# Patient Record
Sex: MALE | Race: BLACK OR AFRICAN AMERICAN | NOT HISPANIC OR LATINO | URBAN - METROPOLITAN AREA
[De-identification: names, ages, dates, MRNs, and addresses within clinical notes are randomized per-mention and may not be internally consistent; named-entity substitution may affect disease eponyms.]

---

## 2017-01-26 ENCOUNTER — TELEPHONE (OUTPATIENT)
Dept: PSYCHIATRY | Facility: CLINIC | Age: 37
End: 2017-01-26

## 2017-01-26 DIAGNOSIS — F84.0 AUTISM SPECTRUM DISORDER: Primary | ICD-10-CM

## 2017-01-26 NOTE — TELEPHONE ENCOUNTER
Medication Requested: Hydroxyzine 50 mg caps  Last Written RX Date: 6-23-16  Last Pharmacy Fill Date: 12-23-16  Last RX Quantity: 30,   # refills: 6    Last Office Visit: 6-23-16  Recommended RTC: 3-6 mo  Next Scheduled Office Visit: NONE    Since last Visit: # of CX 0 ,# of NOS 0    Last Visit Recommendations for:  Medications: cpntinue  Labs: 0    Will send message to scheduling for an appointment.  Will then route to provider due to delay in follow up.      Kathleen M Doege RN

## 2017-01-30 RX ORDER — HYDROXYZINE PAMOATE 50 MG/1
50 CAPSULE ORAL AT BEDTIME
Qty: 30 CAPSULE | Refills: 0 | Status: SHIPPED | OUTPATIENT
Start: 2017-01-30 | End: 2017-03-14

## 2017-01-30 NOTE — TELEPHONE ENCOUNTER
No appointment scheduled to date.    Will refill 30 day supply per protocol. Instructions on the order to call the clinic for an appointment.  Then route to provider as FYMIGEL.    Kathleen M Doege RN

## 2017-02-13 DIAGNOSIS — F84.0 AUTISM: ICD-10-CM

## 2017-02-13 NOTE — TELEPHONE ENCOUNTER
Medication Requested: ARIPiprazole (ABILIFY) 30 MG   Last Written RX Date: 6/23/16  Last Pharmacy Fill Date: 1/13/17  Last RX Quantity: 30,   # refills: 6    Last Office Visit: 6/23/16  Recommended RTC: 3-6 MOS  Next Scheduled Office Visit: NONE  OVER DUE    Since last Visit: # of CX 0 ,# of NOS 0    Last Visit Recommendations for:  Medications: CONTINUE  Labs: 0  Will send message to scheduling for an appointment. OVER DUE   Elizabeth Diaz Julia B, RN                 The number listed is disconnected and there is no other [facility] contact number.     Thanks!

## 2017-02-15 RX ORDER — ARIPIPRAZOLE 30 MG/1
30 TABLET ORAL DAILY
Qty: 30 TABLET | Refills: 0 | Status: SHIPPED | OUTPATIENT
Start: 2017-02-15 | End: 2017-05-18

## 2017-03-13 DIAGNOSIS — F84.0 AUTISM SPECTRUM DISORDER: ICD-10-CM

## 2017-03-13 NOTE — TELEPHONE ENCOUNTER
Medication Requested: Hydroxyzine 50 mg caps  Last Written RX Date: 1-30-17  Last Pharmacy Fill Date: 1-30-17  Last RX Quantity: 30,   # refills: 0    Last Office Visit: 6-23-16  Recommended RTC: 3-6 mo  Next Scheduled Office Visit: none    Since last Visit: # of CX 0 ,# of NOS 0    Last Visit Recommendations for:  Medications: continue  Labs: 0      Will send message to scheduling for an appointment.  Stephen then refill per protocol.    Kathleen M Doege RN

## 2017-03-14 RX ORDER — HYDROXYZINE PAMOATE 50 MG/1
50 CAPSULE ORAL AT BEDTIME
Qty: 30 CAPSULE | Refills: 0 | Status: SHIPPED | OUTPATIENT
Start: 2017-03-14 | End: 2017-05-18

## 2017-03-14 NOTE — TELEPHONE ENCOUNTER
Burd, Holly B. Doege, Summer QUINTANA, ANY                   I called the number listed for the pt and it is not working. He does not have my chart either.     Pat           Order for 30 day supply/qyt sufficient to next appointment sent to pharmacy to avoid decompensation.    Provider sent an FYI.      Kathleen M Doege RN

## 2017-03-20 ENCOUNTER — TELEPHONE (OUTPATIENT)
Dept: PSYCHIATRY | Facility: CLINIC | Age: 37
End: 2017-03-20

## 2017-03-20 NOTE — TELEPHONE ENCOUNTER
Medication Requested: Abilify 30 mg tabs  Last Written RX Date: 2-15-17  Last Pharmacy Fill Date: 2-15-17  Last RX Quantity: 30,   # refills: 0    Last Office Visit: 6-23-17  Recommended RTC:  3-6 mo  Next Scheduled Office Visit: none    Since last Visit: # of CX 0 ,# of NOS 0    Last Visit Recommendations for:  Medications: decrease by 2.5 mg daily  Labs: 0    Pt is to be decreasing dose by 2.5 mg - but only has 10 mg and 30 mg tabs ordered.  We have no contact information for the patient.    Will route to provider for clarification of dose and due to the fact that we have no contact information and pt has no appointment.        Kathleen M Doege RN

## 2017-03-21 NOTE — TELEPHONE ENCOUNTER
Per 6/23/16 appt note:    -Taper Abilify by 2.5 mg per day.  -If taper & d/c instructions were followed pt would have been off Abilify over the course of 14 days (~7/7/16).    Called [facility] pharmacy for updated contact info:  [phone]  -demographics screen is updated with this info    Called pt's mom Sisi:   -Pt is still taking Abilify.  -When she gets home she will check the dose.   -She will call back with dose to be refilled and to schedule appt.

## 2017-03-27 NOTE — TELEPHONE ENCOUNTER
Burd, Holly B. Doege, Kathleen M, RN                   Mom called back and scheduled for the next opening on 5/18/17

## 2017-05-18 ENCOUNTER — OFFICE VISIT (OUTPATIENT)
Dept: PSYCHIATRY | Facility: CLINIC | Age: 37
End: 2017-05-18
Attending: PSYCHIATRY & NEUROLOGY
Payer: MEDICAID

## 2017-05-18 VITALS
SYSTOLIC BLOOD PRESSURE: 130 MMHG | BODY MASS INDEX: 35.11 KG/M2 | HEART RATE: 80 BPM | DIASTOLIC BLOOD PRESSURE: 86 MMHG | WEIGHT: 211 LBS

## 2017-05-18 DIAGNOSIS — F84.0 AUTISM SPECTRUM DISORDER: ICD-10-CM

## 2017-05-18 DIAGNOSIS — F84.0 AUTISM: ICD-10-CM

## 2017-05-18 PROCEDURE — 99212 OFFICE O/P EST SF 10 MIN: CPT | Mod: ZF

## 2017-05-18 RX ORDER — ARIPIPRAZOLE 30 MG/1
30 TABLET ORAL DAILY
Qty: 30 TABLET | Refills: 3 | Status: SHIPPED | OUTPATIENT
Start: 2017-05-18 | End: 2017-10-04

## 2017-05-18 RX ORDER — HYDROXYZINE PAMOATE 50 MG/1
50 CAPSULE ORAL AT BEDTIME
Qty: 30 CAPSULE | Refills: 3 | Status: SHIPPED | OUTPATIENT
Start: 2017-05-18 | End: 2017-10-29

## 2017-05-18 NOTE — MR AVS SNAPSHOT
After Visit Summary   2017    Carloz Drummond    MRN: [id number]           Patient Information     Date Of Birth          1980        Visit Information        Provider Department      2017 9:00 AM Homans, Jonathan C, MD [facility]        Today's Diagnoses     Autism        Autism spectrum disorder           Follow-ups after your visit        Who to contact     Please call your clinic at [phone] to:    Ask questions about your health    Make or cancel appointments    Discuss your medicines    Learn about your test results    Speak to your doctor   If you have compliments or concerns about an experience at your clinic, or if you wish to file a complaint, please contact [facility] Patient Relations at [phone] or email us at [email or web address]         Additional Information About Your Visit        MyChart Information     [employer] is an electronic gateway that provides easy, online access to your medical records. With [employer], you can request a clinic appointment, read your test results, renew a prescription or communicate with your care team.     To sign up for [employer] visit the website at [email or web address]   You will be asked to enter the access code listed below, as well as some personal information. Please follow the directions to create your username and password.     Your access code is: [email or web address]  Expires: 2017  2:35 PM     Your access code will  in 90 days. If you need help or a new code, please contact your [facility] or call [phone] for assistance.        Care EveryWhere ID     This is your Care EveryWhere ID. This could be used by other organizations to access your [facility] medical records  [id number]        Your Vitals Were     Pulse BMI (Body Mass Index)                80 35.11 kg/m2           Blood Pressure from Last 3 Encounters:   17 130/86   16  113/76   10/08/15 139/87    Weight from Last 3 Encounters:   05/18/17 95.7 kg (211 lb)   06/23/16 96.6 kg (213 lb)   10/08/15 101.7 kg (224 lb 3.2 oz)              Today, you had the following     No orders found for display         Today's Medication Changes          These changes are accurate as of: 5/18/17  2:35 PM.  If you have any questions, ask your nurse or doctor.               These medicines have changed or have updated prescriptions.        Dose/Directions    ARIPiprazole 30 MG tablet   Commonly known as:  ABILIFY   This may have changed:  Another medication with the same name was removed. Continue taking this medication, and follow the directions you see here.   Used for:  Autism   Changed by:  Homans, Jonathan C, MD        Dose:  30 mg   Take 1 tablet (30 mg) by mouth daily   Quantity:  30 tablet   Refills:  3         Stop taking these medicines if you haven't already. Please contact your care team if you have questions.     risperiDONE 1 MG ODT tab   Commonly known as:  RISPERDAL M-TAB   Stopped by:  Homans, Jonathan C, MD                Where to get your medicines      These medications were sent to [facility]/pharmacy #[id number] - [place], [place] - [address]  [address], [place] [address]    Hours:  24-hours Phone:  [phone]     ARIPiprazole 30 MG tablet    hydrOXYzine 50 MG capsule                Primary Care Provider Office Phone # Fax #    [facility] [phone]       [address] [place] [id number]        Thank you!     Thank you for choosing PSYCHIATRY CLINIC  for your care. Our goal is always to provide you with excellent care. Hearing back from our patients is one way we can continue to improve our services. Please take a few minutes to complete the written survey that you may receive in the mail after your visit with us. Thank you!             Your Updated Medication List - Protect others around you: Learn how to safely use, store and  throw away your medicines at www.disposemymeds.org.          This list is accurate as of: 5/18/17  2:35 PM.  Always use your most recent med list.                   Brand Name Dispense Instructions for use    ARIPiprazole 30 MG tablet    ABILIFY    30 tablet    Take 1 tablet (30 mg) by mouth daily       hydrOXYzine 50 MG capsule    VISTARIL    30 capsule    Take 1 capsule (50 mg) by mouth At Bedtime or a little earlier to induce sleep       lisinopril-hydrochlorothiazide 10-12.5 MG per tablet    PRINZIDE/ZESTORETIC     Take 1 tablet by mouth daily

## 2017-05-18 NOTE — PROGRESS NOTES
CHILD PSYCHIATRY CLINIC PROGRESS NOTE   Patient has been followed by Dr. Hartman since prior to 2008    INTERIM HISTORY                                                   Carloz Drummond is a 37 year old male who was last seen in clinic on 6/23/2016 at which time Abilify was tapered to 2.5 mg.   Patient presented to clinic with his mother for follow up.  Currently prescribed hydroxyzine 50 mg QHS PRN for sleep  Aripiprazole 30 mg daily   Lisinopril - HCTZ 10-12.5 mg tab     Currently at home, struggles to listen, has difficulty responding to redirection from mother. Mother notes that sometimes he seems difficult to redirect.   No self injury, or aggression.   Works at his job for supportive employment      Social Updates (home/ school/ substance use): Continues to live at home with mother and brothers.        MEDICAL ROS:  Denies headache, vision changes, bloody nose, cough, shortness of breath, chest pain or pressure, dizziness, nausea, vomiting, diarrhea, abdominal pain, muscle or joint pain, numbness or tingling in hands or feet.      PAST PSYCH MED TRIALS                                  Naltrexone (self injury)   Risperidone     MEDICATIONS                               Current Outpatient Prescriptions   Medication Sig Dispense Refill     hydrOXYzine (VISTARIL) 50 MG capsule Take 1 capsule (50 mg) by mouth At Bedtime or a little earlier to induce sleep 30 capsule 0     ARIPiprazole (ABILIFY) 30 MG tablet Take 1 tablet (30 mg) by mouth daily 30 tablet 0     ARIPiprazole (ABILIFY) 10 MG tablet Take 0.5 tablets (5 mg) by mouth daily 30 tablet 6     risperiDONE (RISPERDAL M-TAB) 1 MG disintegrating tablet Take 1 tablet (1 mg) by mouth daily as needed (for agitation) 30 tablet 6     lisinopril-hydrochlorothiazide (PRINZIDE,ZESTORETIC) 10-12.5 MG per tablet Take 1 tablet by mouth daily          VITALS   There were no vitals taken for this visit.   MENTAL STATUS EXAM                                                   "           Alertness: alert  and oriented  Appearance: appears his stated age, with a tall head and low hair line, small ears. Overweight, but wearing clean clothes  Behavior/Demeanor: cooperative and pleasant, with poor eye contact   Speech: mostly one word responses.   Language: lmited vocabulary  Psychomotor: frequently rocking and/or shuffling cards.   Mood: \"happy\"   Affect: bright and euthymic.  Thought Process/Associations: linear, concrete and logical.   Thought Content:  Denies SI, HI,   Perception:  Denies hallucinations or delusions   Insight: poor  Judgment: limited  Cognition:  does not appear grossly intact; formal cognitive testing was not done    LABS and DATA     No lab data, mother indicates that PCP is monitoring lipids.     DIAGNOSIS   ASD     ASSESSMENT                                     Carloz Drummond is a 37 year old male with ASD, intellectual disability. Has had issues in the past with irritability and aggression surrounding restricted interests and rigidity. Previously prescribed naltrexone and risperidone in addition to abilify to target these behaviors. With environmental changes, he has been able to taper off of naltrexone and risperidone.     Current: Increase in difficulty with redirection without clear cause. We discussed more emphasis on positive redirection (more than just saying no or stop). No medication changes.        PLAN                                                                                                       1) PSYCHOTROPIC MEDICATIONS:   - continue aripiprazole 30 mg daily               - continue hydroxyzine 50 mg QHS PRN for     3) LABS NEXT DUE: Per PCP    4) REFERRALS:  none    6) RTC: 3 months      TREATMENT RISK STATEMENT:  The risks, benefits, alternatives and potential adverse effects have been discussed and are understood by the patient/ patient's guardian. The pt understands the risks of using street drugs or alcohol.  There are no medical " contraindications, the pt agrees to treatment with the ability to do so.  The patient understands to call 911 or come to the nearest ED if life threatening or urgent symptoms present.    Fellow:  Jonathan C. Homans, MD    Patient staffed in clinic with Dr. Hartman who will sign the note.  Supervisor is Dr. Hartman.  ..I saw the patient with the resident, and participated in key portions of the service, including the mental status examination and developing the plan of care. I reviewed key portions of the history with the resident. I agree with the findings and plan as documented in this note.    Efraín Hartman

## 2017-05-18 NOTE — NURSING NOTE
Chief Complaint   Patient presents with     Recheck Medication     autism     Reviewed allergies, smoking status, and pharmacy preference  Administered abuse screening questions   Obtained weight, blood pressure and heart rate

## 2017-10-04 DIAGNOSIS — F84.0 AUTISM: ICD-10-CM

## 2017-10-04 RX ORDER — ARIPIPRAZOLE 30 MG/1
30 TABLET ORAL DAILY
Qty: 30 TABLET | Refills: 0 | Status: SHIPPED | OUTPATIENT
Start: 2017-10-04 | End: 2017-11-01

## 2017-10-04 NOTE — TELEPHONE ENCOUNTER
Medication requested: Abilify 30 mg tabs  Last refilled: 9-5-17  Qty: 30      Last seen: 5-18-17  RTC: 3 mo  Cancel: 0  No-show: 0  Next appt: none    Refill decision: 30 day farida refill due to no scheduled appointment sent to the pharmacy - including instructions for patient to call the clinic and schedule an appointment.      Kathleen M Doege RN

## 2017-10-29 DIAGNOSIS — F84.0 AUTISM SPECTRUM DISORDER: ICD-10-CM

## 2017-10-29 NOTE — TELEPHONE ENCOUNTER
Medication requested: Hydroxyzine 50 mg caps  Last refilled: 9-23-17  Qty: 30      Last seen: 5-18-17  RTC: 3 mo  Cancel: 0  No-show: 0  Next appt: none    Refill decision: Pt outside of RTC timeframe and no scheduled appointment. Will send message to scheduling to contact pt. Will determine refill status once scheduled or not able to contact pt. Medication pended for 30 day farida refill - but not sent - waiting for final decision.

## 2017-11-01 DIAGNOSIS — F84.0 AUTISM: ICD-10-CM

## 2017-11-01 RX ORDER — ARIPIPRAZOLE 30 MG/1
30 TABLET ORAL DAILY
Qty: 14 TABLET | Refills: 0 | Status: SHIPPED | OUTPATIENT
Start: 2017-11-01 | End: 2017-11-16

## 2017-11-01 NOTE — TELEPHONE ENCOUNTER
Medication requested: Abilify 30 mg tabs  Last refilled: 10-4-17  Qty: 30      Last seen: 5-18-17  RTC: 3 mo  Cancel: 0  No-show: 0  Next appt: none    Refill decision: 14 day farida refill due to no scheduled appointment sent to the pharmacy - including instructions for patient to call the clinic and schedule an appointment, message is sent to the scheduling staff to call the patient to get the patient scheduled and an FYI is sent to the provider.        Kathleen M Doege RN

## 2017-11-02 RX ORDER — HYDROXYZINE PAMOATE 50 MG/1
50 CAPSULE ORAL AT BEDTIME
Qty: 30 CAPSULE | Refills: 2 | Status: SHIPPED | OUTPATIENT
Start: 2017-11-02 | End: 2018-01-18

## 2017-11-02 NOTE — TELEPHONE ENCOUNTER
Burd, Holly B. Doege, Summer QUIN[place], ANY                   Pt is scheduled on 1/18           Will send 30 day refill with 2 additional fills and FYI to provider as is outside RTC timeframe.      Kathleen M Doege RN

## 2017-11-16 DIAGNOSIS — F84.0 AUTISM: ICD-10-CM

## 2017-11-16 RX ORDER — ARIPIPRAZOLE 30 MG/1
30 TABLET ORAL DAILY
Qty: 30 TABLET | Refills: 1 | Status: SHIPPED | OUTPATIENT
Start: 2017-11-16 | End: 2018-01-18

## 2017-11-16 NOTE — TELEPHONE ENCOUNTER
Medication requested: Abilify 30 mg tabs  Last refilled: 11-1-17  Qty: 14      Last seen: 5-18-17  RTC: 3 mo  Cancel: 0  No-show: 0  Next appt: 1-18-17    Refill decision: Refilled for 30 days per protocol with 1 additional fill. FYI sent to provider as is outside the RTC timeframe.      Kathleen M Doege RN

## 2018-01-18 ENCOUNTER — OFFICE VISIT (OUTPATIENT)
Dept: PSYCHIATRY | Facility: CLINIC | Age: 38
End: 2018-01-18
Attending: PSYCHIATRY & NEUROLOGY
Payer: MEDICAID

## 2018-01-18 VITALS
DIASTOLIC BLOOD PRESSURE: 81 MMHG | HEART RATE: 66 BPM | BODY MASS INDEX: 35.54 KG/M2 | SYSTOLIC BLOOD PRESSURE: 117 MMHG | WEIGHT: 213.6 LBS

## 2018-01-18 DIAGNOSIS — F84.0 AUTISM: Primary | ICD-10-CM

## 2018-01-18 DIAGNOSIS — F84.0 AUTISM SPECTRUM DISORDER: ICD-10-CM

## 2018-01-18 PROCEDURE — G0463 HOSPITAL OUTPT CLINIC VISIT: HCPCS | Mod: ZF

## 2018-01-18 RX ORDER — ARIPIPRAZOLE 30 MG/1
30 TABLET ORAL DAILY
Qty: 30 TABLET | Refills: 3 | Status: SHIPPED | OUTPATIENT
Start: 2018-01-18 | End: 2018-05-18

## 2018-01-18 RX ORDER — HYDROXYZINE PAMOATE 50 MG/1
50 CAPSULE ORAL AT BEDTIME
Qty: 30 CAPSULE | Refills: 3 | Status: SHIPPED | OUTPATIENT
Start: 2018-01-18 | End: 2018-06-16

## 2018-01-18 NOTE — PROGRESS NOTES
CHILD PSYCHIATRY CLINIC PROGRESS NOTE   Patient has been followed by Dr. Hartman since prior to 2008    INTERIM HISTORY                                                   Carloz Drummond is a 37 year old male who was last seen in clinic on 5/8/2017. Patient presented to clinic with his mother for follow up.  - Mom reports symptoms have been stable. Patient is responding to redirection most times when needed. He has not had any self injury or aggressive behaviors.   - Mom reports h/o ripping objects, collecting beans and other things in his pocket, and significant agitation and self injury during childhood.   - Patient has PCA on some days. Mom discussed plan for patient to live with siblings once unable to live with mom in the future.   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 He is unable to describe work with more than 1-2 word answers. He reports he has a friend at work named Lazara.   - Mom denies noticing any adverse effects on medication.       Social Updates (home/ school/ substance use): Continues to live at home with mother and brothers.        MEDICAL ROS:  No reported or observed signs of  headache, vision changes, bloody nose, cough, shortness of breath, chest pain or pressure, dizziness, nausea, vomiting, diarrhea, abdominal pain, muscle or joint pain, numbness or tingling in hands or feet.      PAST PSYCH MED TRIALS                                  Naltrexone (self injury)   Risperidone     MEDICATIONS                               Current Outpatient Prescriptions   Medication Sig Dispense Refill     ARIPiprazole (ABILIFY) 30 MG tablet Take 1 tablet (30 mg) by mouth daily 30 tablet 1     hydrOXYzine (VISTARIL) 50 MG capsule Take 1 capsule (50 mg) by mouth At Bedtime or a little earlier to induce sleep 30 capsule 2     lisinopril-hydrochlorothiazide (PRINZIDE,ZESTORETIC) 10-12.5 MG per tablet Take 1 tablet by mouth daily          VITALS   /81  Pulse 66  Wt  "96.9 kg (213 lb 9.6 oz)  BMI 35.54 kg/m2   MENTAL STATUS EXAM                                                             Alertness: alert  and oriented  Appearance: appears his stated age, with a tall head and low hair line, small ears. Overweight, but wearing clean clothes. Observed to be playing with cards, without known set of gameplay or rules.  Behavior/Demeanor: cooperative and pleasant, with poor eye contact   Speech: mostly 1-2 word responses.   Language: limited vocabulary  Psychomotor: frequently rocking and/or shuffling cards.   Mood: \"happy\"   Affect: bright and euthymic.  Thought Process/Associations: linear, concrete and logical.   Thought Content:  No evidence of SI, HI, of self injury thoughts or urges.  Perception:  Denies hallucinations or delusions   Insight: poor  Judgment: limited  Cognition:  does not appear grossly intact; formal cognitive testing was not done    LABS and DATA     No lab data, mother indicates that PCP is monitoring lipids.     DIAGNOSIS   Autism Spectrum Disorder     ASSESSMENT                                     Carloz Drummond is a 37 year old male with ASD, intellectual disability. He has had issues in the past with irritability and aggression surrounding restricted interests and rigidity. Previously prescribed naltrexone and risperidone in addition to abilify to target these behaviors. With environmental changes, he has been able to taper off of naltrexone and risperidone.     Current: Carloz seems to be functioning at baseline today. Mom is usually able to redirect him when needed and identifies ways of helping him stay calm. He continues to tolerate medications and will continue with current regimen today. Discussed eventual plan to consider tapering neuroleptic. Patient is due for lab monitoring for metabolic side effects- labs ordered and printed for primary care clinic today. Will continue to discuss potential long term plan of group home living vs living with " family.     PLAN                                                                                                       1) PSYCHOTROPIC MEDICATIONS:   - continue aripiprazole 30 mg daily               - continue hydroxyzine 50 mg QHS PRN for     3) LABS NEXT DUE: Ordered repeat CMP, CBC, Hgb A1C, and fasting lipid panel today- sent mom with paper copy for primary care clinic.    4) REFERRALS:  none    6) RTC: 6 months      TREATMENT RISK STATEMENT:  The risks, benefits, alternatives and potential adverse effects have been discussed and are understood by the patient/ patient's guardian. The pt understands the risks of using street drugs or alcohol.  There are no medical contraindications, the pt agrees to treatment with the ability to do so.  The patient understands to call 911 or come to the nearest ED if life threatening or urgent symptoms present.    Travis Fahrenkamp, MD  Child & Adolescent Psychiatry Fellow, PGY-5    Patient staffed in clinic with Dr. Hartman who will sign the note.     I saw the patient with the resident, and participated in key portions of the service, including the mental status examination and developing the plan of care. I reviewed key portions of the history with the resident. I agree with the findings and plan as documented in this note.    Efraín Hartman M.D.

## 2018-01-18 NOTE — NURSING NOTE
Chief Complaint   Patient presents with     Recheck Medication     Autism      Reviewed Allergies, Medications, Pharmacy, Smoking Status   Obtained Weight, Blood Pressure, Heart Rate

## 2018-01-18 NOTE — MR AVS SNAPSHOT
After Visit Summary   2018    Carloz Drummond    MRN: [id number]           Patient Information     Date Of Birth          1980        Visit Information        Provider Department      2018 10:00 AM Efraín Hartman MD Psychiatry Clinic [facility] PSYCHIATRY      Today's Diagnoses     Autism    -  1    Autism spectrum disorder           Follow-ups after your visit        Follow-up notes from your care team     Return in about 6 months (around 2018).      Who to contact     Please call your clinic at [phone] to:    Ask questions about your health    Make or cancel appointments    Discuss your medicines    Learn about your test results    Speak to your doctor   If you have compliments or concerns about an experience at your clinic, or if you wish to file a complaint, please contact [facility] Patient Relations at [phone] or email us at [email or web address]         Additional Information About Your Visit        MyChart Information     Mirificet is an electronic gateway that provides easy, online access to your medical records. With Sarta, you can request a clinic appointment, read your test results, renew a prescription or communicate with your care team.     To sign up for Mirificet visit the website at [email or web address]   You will be asked to enter the access code listed below, as well as some personal information. Please follow the directions to create your username and password.     Your access code is: [email or web address]  Expires: 2018  5:03 PM     Your access code will  in 90 days. If you need help or a new code, please contact your [facility] or call [phone] for assistance.        Care EveryWhere ID     This is your Care EveryWhere ID. This could be used by other organizations to access your [place] medical records  [id number]        Your Vitals Were     Pulse BMI (Body Mass Index)                 66 35.54 kg/m2           Blood Pressure from Last 3 Encounters:   01/18/18 117/81   05/18/17 130/86   06/23/16 113/76    Weight from Last 3 Encounters:   01/18/18 96.9 kg (213 lb 9.6 oz)   05/18/17 95.7 kg (211 lb)   06/23/16 96.6 kg (213 lb)              We Performed the Following     CBC with Platelets Differential (External Lab)     Comprehensive Metabolic Panel     Hemoglobin A1c     Lipid Profile          Where to get your medicines      These medications were sent to [facility]pharm[facility] #[id number] [facility] [place] [place] [facility] [address] [facility]  [address], [place] [address]     Phone:  [phone]     ARIPiprazole 30 MG tablet    hydrOXYzine 50 MG capsule          Primary Care Provider Office Phone # Fax #    [facility] [phone] [address] [facility] [address]        Equal Access to Services     JUAN ELIZALDE : Hadii goran scotto Soeduardo, waaxda luqadaha, qaybta kaalmada adefloyada, rekha fox . So [facility] [phone].    ATENCIÓN: Si habla español, tiene a frausto disposición servicios gratuitos de asistencia lingüística. [facility] [phone].    We comply with applicable federal civil rights laws and [place] laws. We do not discriminate on the basis of race, color, national origin, age, disability, sex, sexual orientation, or gender identity.            Thank you!     Thank you for choosing PSYCHIATRY CLINIC  for your care. Our goal is always to provide you with excellent care. Hearing back from our patients is one way we can continue to improve our services. Please take a few minutes to complete the written survey that you may receive in the mail after your visit with us. Thank you!             Your Updated Medication List - Protect others around you: Learn how to safely use, store and throw away your medicines at www.disposemymeds.org.          This list is accurate as of 1/18/18 11:59 PM.  Always use your most recent med  list.                   Brand Name Dispense Instructions for use Diagnosis    ARIPiprazole 30 MG tablet    ABILIFY    30 tablet    Take 1 tablet (30 mg) by mouth daily    Autism       hydrOXYzine 50 MG capsule    VISTARIL    30 capsule    Take 1 capsule (50 mg) by mouth At Bedtime or a little earlier to induce sleep    Autism spectrum disorder       lisinopril-hydrochlorothiazide 10-12.5 MG per tablet    PRINZIDE/ZESTORETIC     Take 1 tablet by mouth daily

## 2018-01-23 ENCOUNTER — TELEPHONE (OUTPATIENT)
Dept: PSYCHIATRY | Facility: CLINIC | Age: 38
End: 2018-01-23

## 2018-01-23 ENCOUNTER — MEDICAL CORRESPONDENCE (OUTPATIENT)
Dept: HEALTH INFORMATION MANAGEMENT | Facility: CLINIC | Age: 38
End: 2018-01-23

## 2018-01-23 NOTE — TELEPHONE ENCOUNTER
Rec'd fax from:  [facility]  Fax [phone]    Regarding:  An additional passenger/pt to accompany pt to non-emergency medical appt and explanation as to why it's necessary for an additional member to assist the passenger/pt.    Requesting:   Completion of form.    When completed:   Fax to above #.      Partially completed and placed in Dr. Hartman's folder to review/sign/date.

## 2018-01-26 NOTE — TELEPHONE ENCOUNTER
Completed.  Faxed to # below.     Copy of form:  -sent to scanning  -retained in clinic until scanning is confirmed

## 2018-05-18 DIAGNOSIS — F84.0 AUTISM: ICD-10-CM

## 2018-05-18 RX ORDER — ARIPIPRAZOLE 30 MG/1
30 TABLET ORAL DAILY
Qty: 30 TABLET | Refills: 0 | Status: SHIPPED | OUTPATIENT
Start: 2018-05-18 | End: 2018-06-28

## 2018-05-18 NOTE — TELEPHONE ENCOUNTER
Medication requested: Abilify 30 mg tabs  Last refilled: 4-15-18  Qty: 30      Last seen: 1-18-18  RTC: 6 mo  Cancel: 0  No-show: 0.  Next appt: none    Refill decision:   30 day farida refill sent to the pharmacy - including instructions for patient to call the clinic and schedule an appointment.    Scheduling has been notified to contact the pt for appointment.      Kathleen M Doege RN

## 2018-06-16 DIAGNOSIS — F84.0 AUTISM SPECTRUM DISORDER: ICD-10-CM

## 2018-06-16 RX ORDER — HYDROXYZINE PAMOATE 50 MG/1
50 CAPSULE ORAL AT BEDTIME
Qty: 30 CAPSULE | Refills: 0 | Status: SHIPPED | OUTPATIENT
Start: 2018-06-16 | End: 2018-06-28

## 2018-06-16 NOTE — TELEPHONE ENCOUNTER
Medication requested: Hydroxyzine 50 mg caps  Last refilled: 5-14-18  Qty: 30      Last seen: 1-18-18  RTC: 6 mo  Cancel: 0  No-show: 0  Next appt: 6-28-18    Refill decision:   Refilled for 30 days per protocol.      Kathleen M Doege RN

## 2018-06-28 ENCOUNTER — OFFICE VISIT (OUTPATIENT)
Dept: PSYCHIATRY | Facility: CLINIC | Age: 38
End: 2018-06-28
Attending: PSYCHIATRY & NEUROLOGY
Payer: MEDICAID

## 2018-06-28 VITALS
WEIGHT: 201.6 LBS | SYSTOLIC BLOOD PRESSURE: 121 MMHG | DIASTOLIC BLOOD PRESSURE: 77 MMHG | HEART RATE: 94 BPM | BODY MASS INDEX: 33.55 KG/M2

## 2018-06-28 DIAGNOSIS — F84.0 AUTISM SPECTRUM DISORDER: ICD-10-CM

## 2018-06-28 DIAGNOSIS — F43.10 PTSD (POST-TRAUMATIC STRESS DISORDER): Primary | ICD-10-CM

## 2018-06-28 DIAGNOSIS — F84.0 AUTISM: ICD-10-CM

## 2018-06-28 PROCEDURE — G0463 HOSPITAL OUTPT CLINIC VISIT: HCPCS | Mod: ZF

## 2018-06-28 RX ORDER — PRAZOSIN HYDROCHLORIDE 1 MG/1
2 CAPSULE ORAL AT BEDTIME
Qty: 30 CAPSULE | Refills: 3 | Status: SHIPPED | OUTPATIENT
Start: 2018-06-28 | End: 2018-11-15

## 2018-06-28 RX ORDER — HYDROXYZINE PAMOATE 50 MG/1
50 CAPSULE ORAL AT BEDTIME
Qty: 30 CAPSULE | Refills: 11 | Status: SHIPPED | OUTPATIENT
Start: 2018-06-28 | End: 2019-02-14 | Stop reason: ALTCHOICE

## 2018-06-28 RX ORDER — ARIPIPRAZOLE 30 MG/1
30 TABLET ORAL DAILY
Qty: 30 TABLET | Refills: 11 | Status: SHIPPED | OUTPATIENT
Start: 2018-06-28 | End: 2019-02-14

## 2018-06-28 NOTE — MR AVS SNAPSHOT
After Visit Summary   2018    Carloz Drummond    MRN: [id number]           Patient Information     Date Of Birth          1980        Visit Information        Provider Department      2018 9:00 AM Efraín Hartman MD Psychiatry Clinic        Today's Diagnoses     PTSD (post-traumatic stress disorder)    -  1    Autism        Autism spectrum disorder           Follow-ups after your visit        Who to contact     Please call your clinic at [phone] to:    Ask questions about your health    Make or cancel appointments    Discuss your medicines    Learn about your test results    Speak to your doctor            Additional Information About Your Visit        MyChart Information     Wings Intellect is an electronic gateway that provides easy, online access to your medical records. With Wings Intellect, you can request a clinic appointment, read your test results, renew a prescription or communicate with your care team.     To sign up for [employer] visit the website at [email or web address]   You will be asked to enter the access code listed below, as well as some personal information. Please follow the directions to create your username and password.     Your access code is: [email or web address]  Expires: 2018  8:56 AM     Your access code will  in 90 days. If you need help or a new code, please contact your [facility] Clinic or call [phone] for assistance.        Care EveryWhere ID     This is your Care EveryWhere ID. This could be used by other organizations to access your [place] medical records  [id number]        Your Vitals Were     Pulse BMI (Body Mass Index)                94 33.55 kg/m2           Blood Pressure from Last 3 Encounters:   18 121/77   18 117/81   17 130/86    Weight from Last 3 Encounters:   18 91.4 kg (201 lb 9.6 oz)   18 96.9 kg (213 lb 9.6 oz)   17 95.7 kg (211 lb)              Today, you had the  following     No orders found for display         Today's Medication Changes          These changes are accurate as of 6/28/18  9:51 AM.  If you have any questions, ask your nurse or doctor.               Start taking these medicines.        Dose/Directions    prazosin 1 MG capsule   Commonly known as:  MINIPRESS   Used for:  PTSD (post-traumatic stress disorder)   Started by:  Efraín Hartman MD        Dose:  2 mg   Take 2 capsules (2 mg) by mouth At Bedtime   Quantity:  30 capsule   Refills:  3            Where to get your medicines      These medications were sent to [facility]/pharmacy #[id number] - [place], [place] - [address] [facility] [address] [facility]  [address], [place] [address]     Phone:  [phone]     ARIPiprazole 30 MG tablet    hydrOXYzine 50 MG capsule    prazosin 1 MG capsule                Primary Care Provider Office Phone # Fax #    [facility] [phone] [address] [facility] [address]        Equal Access to Services     JUAN ELIZALDE AH: Hadii aad ku hadasho Soeduardo, waaxda luqadaha, qaybta kaalmada adeegyada, rekha kuin mekhi fox . So [facility] [phone].    ATENCIÓN: Si lincolnla español, tiene a frausto disposición servicios gratuitos de asistencia lingüística. [facility] [phone].    We comply with applicable federal civil rights laws and [place] laws. We do not discriminate on the basis of race, color, national origin, age, disability, sex, sexual orientation, or gender identity.            Thank you!     Thank you for choosing PSYCHIATRY CLINIC  for your care. Our goal is always to provide you with excellent care. Hearing back from our patients is one way we can continue to improve our services. Please take a few minutes to complete the written survey that you may receive in the mail after your visit with us. Thank you!             Your Updated Medication List - Protect others around you: Learn how to safely use, store and throw away your  medicines at www.disposemymeds.org.          This list is accurate as of 6/28/18  9:51 AM.  Always use your most recent med list.                   Brand Name Dispense Instructions for use Diagnosis    ARIPiprazole 30 MG tablet    ABILIFY    30 tablet    Take 1 tablet (30 mg) by mouth daily    Autism       hydrOXYzine 50 MG capsule    VISTARIL    30 capsule    Take 1 capsule (50 mg) by mouth At Bedtime or a little earlier to induce sleep    Autism spectrum disorder       lisinopril-hydrochlorothiazide 10-12.5 MG per tablet    PRINZIDE/ZESTORETIC     Take 1 tablet by mouth daily        prazosin 1 MG capsule    MINIPRESS    30 capsule    Take 2 capsules (2 mg) by mouth At Bedtime    PTSD (post-traumatic stress disorder)
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
## 2018-11-15 DIAGNOSIS — F43.10 PTSD (POST-TRAUMATIC STRESS DISORDER): ICD-10-CM

## 2018-11-15 RX ORDER — PRAZOSIN HYDROCHLORIDE 1 MG/1
2 CAPSULE ORAL AT BEDTIME
Qty: 60 CAPSULE | Refills: 1 | Status: SHIPPED | OUTPATIENT
Start: 2018-11-15 | End: 2019-01-16

## 2018-11-15 NOTE — TELEPHONE ENCOUNTER
Medication requested: prazosin (MINIPRESS) 1 MG capsule  Last refilled: 10/31/18  Qty: 30      Last seen: 6/28/18  RTC: 6 months  Cancel: 0  No-show: 0  Next appt: 1/10/19    Refill decision:   Refilled for 30 days per protocol.

## 2019-01-15 DIAGNOSIS — F43.10 PTSD (POST-TRAUMATIC STRESS DISORDER): ICD-10-CM

## 2019-01-15 NOTE — TELEPHONE ENCOUNTER
Medication requested:Prazosin 1 mg: take two capsules at bedtime.  Last refilled: 12/16/2018 by fax  Qty: 60      Last seen: 6/28/2018  RTC: 6 months   Cancel: 0  No-show: 0  Next appt: 2/14/2019    Refill decision:Refill pended and routed to the provider for review/determination due to  delay in follow up.

## 2019-01-16 RX ORDER — PRAZOSIN HYDROCHLORIDE 1 MG/1
2 CAPSULE ORAL AT BEDTIME
Qty: 60 CAPSULE | Refills: 1 | Status: SHIPPED | OUTPATIENT
Start: 2019-01-16 | End: 2019-02-14

## 2019-02-14 ENCOUNTER — OFFICE VISIT (OUTPATIENT)
Dept: PSYCHIATRY | Facility: CLINIC | Age: 39
End: 2019-02-14
Attending: PSYCHIATRY & NEUROLOGY
Payer: MEDICAID

## 2019-02-14 VITALS
DIASTOLIC BLOOD PRESSURE: 78 MMHG | SYSTOLIC BLOOD PRESSURE: 118 MMHG | HEART RATE: 78 BPM | BODY MASS INDEX: 33.51 KG/M2 | WEIGHT: 201.4 LBS

## 2019-02-14 DIAGNOSIS — F43.10 PTSD (POST-TRAUMATIC STRESS DISORDER): ICD-10-CM

## 2019-02-14 DIAGNOSIS — F84.0 AUTISM: ICD-10-CM

## 2019-02-14 DIAGNOSIS — I27.0 PRIMARY PULMONARY HYPERTENSION (H): Primary | ICD-10-CM

## 2019-02-14 DIAGNOSIS — Z23 NEED FOR PROPHYLACTIC VACCINATION AND INOCULATION AGAINST INFLUENZA: ICD-10-CM

## 2019-02-14 DIAGNOSIS — F84.0 AUTISM SPECTRUM DISORDER: ICD-10-CM

## 2019-02-14 PROCEDURE — G0463 HOSPITAL OUTPT CLINIC VISIT: HCPCS | Mod: 25

## 2019-02-14 PROCEDURE — 90686 IIV4 VACC NO PRSV 0.5 ML IM: CPT | Mod: ZF

## 2019-02-14 PROCEDURE — G0008 ADMIN INFLUENZA VIRUS VAC: HCPCS | Mod: ZF

## 2019-02-14 PROCEDURE — 25000128 H RX IP 250 OP 636: Mod: ZF

## 2019-02-14 RX ORDER — TRAZODONE HYDROCHLORIDE 100 MG/1
50 TABLET ORAL AT BEDTIME
Qty: 45 TABLET | Refills: 11 | Status: SHIPPED | OUTPATIENT
Start: 2019-02-14 | End: 2019-02-14 | Stop reason: ALTCHOICE

## 2019-02-14 RX ORDER — TRAZODONE HYDROCHLORIDE 100 MG/1
50 TABLET ORAL AT BEDTIME
Qty: 45 TABLET | Refills: 11 | Status: SHIPPED | OUTPATIENT
Start: 2019-02-14 | End: 2020-03-18

## 2019-02-14 RX ORDER — PRAZOSIN HYDROCHLORIDE 1 MG/1
2 CAPSULE ORAL AT BEDTIME
Qty: 60 CAPSULE | Refills: 11 | Status: SHIPPED | OUTPATIENT
Start: 2019-02-14 | End: 2020-03-18

## 2019-02-14 RX ORDER — HYDROXYZINE PAMOATE 50 MG/1
50 CAPSULE ORAL AT BEDTIME
Qty: 30 CAPSULE | Refills: 11 | Status: SHIPPED | OUTPATIENT
Start: 2019-02-14 | End: 2019-02-14 | Stop reason: ALTCHOICE

## 2019-02-14 RX ORDER — ARIPIPRAZOLE 30 MG/1
30 TABLET ORAL DAILY
Qty: 30 TABLET | Refills: 11 | Status: SHIPPED | OUTPATIENT
Start: 2019-02-14 | End: 2020-03-18

## 2019-02-14 RX ORDER — LISINOPRIL/HYDROCHLOROTHIAZIDE 10-12.5 MG
1 TABLET ORAL DAILY
Qty: 30 TABLET | Refills: 0 | Status: SHIPPED | OUTPATIENT
Start: 2019-02-14 | End: 2024-06-05

## 2019-02-14 NOTE — PATIENT INSTRUCTIONS
Thank you for coming to the PSYCHIATRY CLINIC.    Lab Testing:  If you had lab testing today and your results are reassuring or normal they will be mailed to you or sent through [employer]Globe within 7 days.   If the lab tests need quick action we will call you with the results.  The phone number we will call with results is # [phone] (home) . If this is not the best number please call our clinic and change the number.    Medication Refills:  If you need any refills please call your pharmacy and they will contact us. Our fax number for refills is [phone]. Please allow three business for refill processing.   If you need to  your refill at a new pharmacy, please contact the new pharmacy directly. The new pharmacy will help you get your medications transferred.     Scheduling:  If you have any concerns about today's visit or wish to schedule another appointment please call our office during normal business hours [phone] (8-5:00 M-F)    Contact Us:  Please call [phone] during business hours (8-5:00 M-F).  If after clinic hours, or on the weekend, please call  [phone].    Financial Assistance [phone]  [employer] Billing [phone]  [employer] Billing [phone]  Medical Records [phone]      MENTAL HEALTH CRISIS NUMBERS:  [facility]:   [facility] - [phone]   Crisis Residence [facility] - [phone]   Walk-In Counseling [facility] [phone]   COPE 24/7 [place] Mobile Team for Adults - [[phone]]; Child - [[phone]]     Crisis Connection - [phone]     [facility]:   [facility] - [phone]   Walk-in counseling [facility] - [phone]   Walk-in counseling [facility] - [phone]   Crisis Residence [facility] - [phone]   Urgent Care Adult Mental Health:   --Drop-in, 24/7 crisis line, and [place] Mobile Team [[phone]]    CRISIS TEXT  LINE: Text [phone] from anywhere, anytime, any crisis 24/7;    OR SEE www.crisistextline.org     Poison Control Center - [phone]    CHILD: [facility] needs assessment team - [phone]     [facility] - [phone]; or [employer] [facility] - [phone]    If you have a medical emergency please call 911or go to the nearest ER.                    _____________________________________________    Again thank you for choosing PSYCHIATRY CLINIC and please let us know how we can best partner with you to improve you and your family's health.  You may be receiving a survey in the mail regarding this appointment. We would love to have your feedback, both positive and negative, so please fill out the survey and return it using the provided envelope. The survey is done by an external company, so your answers are anonymous.

## 2019-02-14 NOTE — NURSING NOTE
Prior to injection, verified patient identity using patient's name and date of birth.  Due to injection administration, patient instructed to remain in clinic for 15 minutes  afterwards, and to report any adverse reaction to me immediately.Misty Ryan LPN

## 2019-02-14 NOTE — PROGRESS NOTES
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
## 2019-05-28 DIAGNOSIS — I27.0 PRIMARY PULMONARY HYPERTENSION (H): ICD-10-CM

## 2019-05-30 RX ORDER — LISINOPRIL/HYDROCHLOROTHIAZIDE 10-12.5 MG
TABLET ORAL
Qty: 30 TABLET | Refills: 0 | OUTPATIENT
Start: 2019-05-30

## 2019-07-26 ENCOUNTER — TELEPHONE (OUTPATIENT)
Dept: PSYCHIATRY | Facility: CLINIC | Age: 39
End: 2019-07-26

## 2019-07-26 NOTE — TELEPHONE ENCOUNTER
Writer received a Level of Need Assessment Form for Transportation. Writer placed the original in Paulette Diop NP's folder and a copy was held at writers desk until completed form returned. Writer called mom back to update that Paulette is out of clinic until Tuesday and will complete the form then. A message was routed to Paulette Diop NP

## 2019-07-26 NOTE — TELEPHONE ENCOUNTER
Health Call Center    Phone Message    May a detailed message be left on voicemail: yes    Reason for Call: Other: Pt's form from medical transportation was faxed over. It needs to be signed so that she can go with him to his appts. The form needs to be sent back before 7/31 when his next appt is scheduled. Please call back mom once the form is completed and faxed back to medical transportation. Mom, Hilary, is guardian.      Action Taken: Other: [facility] Psychiatry Pool

## 2019-07-30 NOTE — TELEPHONE ENCOUNTER
Provider requested writer connect with pt's mother to complete level of need assessment form, as provider has not seen pt. Writer partially completed this and placed it in the provider's folder for completion and signature.

## 2019-07-30 NOTE — TELEPHONE ENCOUNTER
Received completed and signed form from the provider. Faxed to [facility] at [phone]. Form held at writer's desk.

## 2019-08-02 NOTE — TELEPHONE ENCOUNTER
Called pt's mother and provided an update, that the form was faxed x3 and next week, we'll have to f/up and make sure MTM received the form.

## 2019-08-02 NOTE — TELEPHONE ENCOUNTER
Writer received incoming phone call from brooklyn Richard's mother. She informed this writer that [facility] never received the Level of Need Assessment form. This was faxed on 7/30 to [phone] (number on the form). Writer refaxed the form and called [facility] at [phone].

## 2019-08-05 ENCOUNTER — MEDICAL CORRESPONDENCE (OUTPATIENT)
Dept: HEALTH INFORMATION MANAGEMENT | Facility: CLINIC | Age: 39
End: 2019-08-05

## 2019-08-05 ENCOUNTER — TELEPHONE (OUTPATIENT)
Dept: PSYCHIATRY | Facility: CLINIC | Age: 39
End: 2019-08-05

## 2019-08-05 NOTE — TELEPHONE ENCOUNTER
Writer received a Medically Necessary Attendant Form. Writer had Paulette Diop NP fill form out and writer faxed the form to [phone], sent the original to scanning and a copy was held at writers desk until scanning complete.

## 2019-12-05 ENCOUNTER — MEDICAL CORRESPONDENCE (OUTPATIENT)
Dept: HEALTH INFORMATION MANAGEMENT | Facility: CLINIC | Age: 39
End: 2019-12-05

## 2019-12-05 ENCOUNTER — TELEPHONE (OUTPATIENT)
Dept: PSYCHIATRY | Facility: CLINIC | Age: 39
End: 2019-12-05

## 2019-12-05 NOTE — TELEPHONE ENCOUNTER
Writer placed 4 completed pages of faxed forms in Delicia Diop's folder for review and signature. A note was routed to provider.Misty Ryan LPN

## 2019-12-05 NOTE — TELEPHONE ENCOUNTER
Signed. It seems this has been an ongoing issue where we do not receive the paperwork soon enough for mother to come in with him. Do you think that is what happened again today?

## 2020-03-18 ENCOUNTER — OFFICE VISIT (OUTPATIENT)
Dept: PSYCHIATRY | Facility: CLINIC | Age: 40
End: 2020-03-18
Attending: NURSE PRACTITIONER
Payer: MEDICAID

## 2020-03-18 VITALS
WEIGHT: 207.6 LBS | DIASTOLIC BLOOD PRESSURE: 92 MMHG | BODY MASS INDEX: 34.55 KG/M2 | HEART RATE: 70 BPM | SYSTOLIC BLOOD PRESSURE: 141 MMHG

## 2020-03-18 DIAGNOSIS — F84.0 AUTISM: ICD-10-CM

## 2020-03-18 DIAGNOSIS — F43.10 PTSD (POST-TRAUMATIC STRESS DISORDER): ICD-10-CM

## 2020-03-18 PROCEDURE — G0463 HOSPITAL OUTPT CLINIC VISIT: HCPCS | Mod: ZF

## 2020-03-18 RX ORDER — PRAZOSIN HYDROCHLORIDE 1 MG/1
CAPSULE ORAL
Qty: 55 CAPSULE | Refills: 0 | Status: SHIPPED | OUTPATIENT
Start: 2020-03-18 | End: 2020-10-07 | Stop reason: DRUGHIGH

## 2020-03-18 RX ORDER — TRAZODONE HYDROCHLORIDE 50 MG/1
50 TABLET, FILM COATED ORAL AT BEDTIME
Qty: 30 TABLET | Refills: 2 | Status: SHIPPED | OUTPATIENT
Start: 2020-03-18 | End: 2020-07-13

## 2020-03-18 RX ORDER — ARIPIPRAZOLE 10 MG/1
TABLET ORAL
Qty: 70 TABLET | Refills: 0 | Status: SHIPPED | OUTPATIENT
Start: 2020-03-18 | End: 2020-10-07 | Stop reason: DRUGHIGH

## 2020-03-18 ASSESSMENT — PAIN SCALES - GENERAL: PAINLEVEL: MODERATE PAIN (4)
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
## 2020-03-18 NOTE — PATIENT INSTRUCTIONS
Thank you for coming to the PSYCHIATRY CLINIC.    Lab Testing:  If you had lab testing today and your results are reassuring or normal they will be mailed to you or sent through [facility] within 7 days.   If the lab tests need quick action we will call you with the results.  The phone number we will call with results is # [phone] (home) . If this is not the best number please call our clinic and change the number.    Medication Refills:  If you need any refills please call your pharmacy and they will contact us. Our fax number for refills is [phone]. Please allow three business for refill processing.   If you need to  your refill at a new pharmacy, please contact the new pharmacy directly. The new pharmacy will help you get your medications transferred.     Scheduling:  If you have any concerns about today's visit or wish to schedule another appointment please call our office during normal business hours [phone] (8-5:00 M-F)    Contact Us:  Please call [phone] during business hours (8-5:00 M-F).  If after clinic hours, or on the weekend, please call  [phone].    Financial Assistance [phone]  [facility] Billing [phone]  Central Billing Office, [facility]: [phone]  [facility] Billing [phone]  Medical Records [phone]      MENTAL HEALTH CRISIS NUMBERS:  [facility]:   [facility] - [phone]   Crisis Residence [facility] - [phone]   Walk-In Counseling [facility] [phone]   COPE 24/7 [facility] Mobile Team for Adults - [[phone]]; Child - [[phone]]        [facility]:   [facility] - [phone]   Walk-in counseling [facility] - [phone]   Walk-in counseling [facility] - [phone]   Crisis Residence [facility] - [phone]   Urgent Care Adult Mental Health:   --Drop-in, 24/7 crisis line, and [place] Mobile Team  [[phone]]    CRISIS TEXT LINE: Text [phone] from anywhere, anytime, any crisis 24/7;    OR SEE www.crisistextline.org     National Suicide Prevention Lifeline: 055-636-XUFY (716-302-0095)    Poison Control Center - [phone]    CHILD: [facility] needs assessment team - [phone]     [facility] Lifeline - [phone]; or [facility] Lifeline - [phone]    If you have a medical emergency please call 911or go to the nearest ER.                    _____________________________________________    Again thank you for choosing PSYCHIATRY CLINIC and please let us know how we can best partner with you to improve you and your family's health.  You may be receiving a survey regarding this appointment. We would love to have your feedback, both positive and negative. The survey is done by an external company, so your answers are anonymous.

## 2020-03-26 ENCOUNTER — TELEPHONE (OUTPATIENT)
Dept: PSYCHIATRY | Facility: CLINIC | Age: 40
End: 2020-03-26

## 2020-03-26 DIAGNOSIS — F84.0 AUTISM SPECTRUM DISORDER: Primary | ICD-10-CM

## 2020-03-26 RX ORDER — ARIPIPRAZOLE 20 MG/1
TABLET ORAL
Qty: 14 TABLET | Refills: 0 | Status: SHIPPED | OUTPATIENT
Start: 2020-04-01 | End: 2020-10-07 | Stop reason: DRUGHIGH

## 2020-03-26 RX ORDER — ARIPIPRAZOLE 15 MG/1
TABLET ORAL
Qty: 7 TABLET | Refills: 0 | Status: SHIPPED | OUTPATIENT
Start: 2020-03-26 | End: 2020-10-07 | Stop reason: DRUGHIGH

## 2020-03-26 RX ORDER — ARIPIPRAZOLE 20 MG/1
TABLET ORAL
Qty: 7 TABLET | Refills: 0 | Status: SHIPPED | OUTPATIENT
Start: 2020-04-15 | End: 2020-10-07 | Stop reason: DRUGHIGH

## 2020-03-26 RX ORDER — ARIPIPRAZOLE 10 MG/1
TABLET ORAL
Qty: 7 TABLET | Refills: 0 | Status: SHIPPED | OUTPATIENT
Start: 2020-04-15 | End: 2020-10-07 | Stop reason: DRUGHIGH

## 2020-03-26 RX ORDER — ARIPIPRAZOLE 10 MG/1
TABLET ORAL
Qty: 7 TABLET | Refills: 0 | Status: SHIPPED | OUTPATIENT
Start: 2020-03-26 | End: 2020-10-07 | Stop reason: DRUGHIGH

## 2020-03-26 RX ORDER — ARIPIPRAZOLE 5 MG/1
TABLET ORAL
Qty: 7 TABLET | Refills: 0 | Status: SHIPPED | OUTPATIENT
Start: 2020-04-08 | End: 2020-10-07 | Stop reason: DRUGHIGH

## 2020-03-26 NOTE — TELEPHONE ENCOUNTER
Received fax from pharmacy indicating that patient's insurance will only cover 1 tablet/day. Pharmacy needs separate scripts for the titration up in dose.     Writer contacted [facility]/PHARMACY #[id number] - [place], [place] - [address] [place] [address] [address] to verify what they will need. Cancelled previous order sent in by provider on 3/18.     Returned call to pharmacy to confirm that they received updated orders. No further action needed at this time.

## 2020-04-21 DIAGNOSIS — F84.0 AUTISM SPECTRUM DISORDER: ICD-10-CM

## 2020-04-21 DIAGNOSIS — F43.10 PTSD (POST-TRAUMATIC STRESS DISORDER): Primary | ICD-10-CM

## 2020-04-21 RX ORDER — ARIPIPRAZOLE 5 MG/1
TABLET ORAL
Qty: 7 TABLET | Refills: 0 | OUTPATIENT
Start: 2020-04-21

## 2020-04-21 NOTE — TELEPHONE ENCOUNTER
"Medication requested:   ARIPiprazole (ABILIFY) 5 MG tablet                Sig: Take 1 tablet (5MG) by mouth with 20MG tablet for daily dose of 25MG for 7 days. Then increase to 30MG.        Last refilled: 4/8/20  Qty: 7      Last seen: 3/18/20 unsigned  \"Plan made to restart abilify 10 mg PO Q Day for 7 days, then increase in increments of 5 mg weekly to a daily total of his previous dose of 30 mg.\"    PSY staff 3/26/20:\"Received fax from pharmacy indicating that patient's insurance will only cover 1 tablet/day. Pharmacy needs separate scripts for the titration up in dose\" Prescriptions sent as requested tapering up to  RTC: unknown  Cancel: 0  No-show: 0  Next appt: 6/10/20    Refill decision:    Denied: prescription is part of stepped taper up to 30 mg daily and dated 4/8/20-4/14/20. 3/26/20: needed separate scripts for titration up in dosage, and these were sent.    "

## 2020-04-21 NOTE — TELEPHONE ENCOUNTER
Could you call Carloz's Mom for me and see where they are at in the ramp up? I would like to confirm that this is all making sense to her. Also, it looks like most of the meds went through except this one and I am not sure why.    Thanks,  Paulette

## 2020-04-21 NOTE — TELEPHONE ENCOUNTER
Placed outgoing call to patient's mom to follow-up on current medication regime. Left VM encouraging call back.

## 2020-04-22 RX ORDER — ARIPIPRAZOLE 30 MG/1
30 TABLET ORAL DAILY
Qty: 30 TABLET | Refills: 2 | Status: SHIPPED | OUTPATIENT
Start: 2020-04-22 | End: 2020-07-24

## 2020-04-22 RX ORDER — PRAZOSIN HYDROCHLORIDE 2 MG/1
2 CAPSULE ORAL AT BEDTIME
Qty: 30 CAPSULE | Refills: 2 | Status: SHIPPED | OUTPATIENT
Start: 2020-04-22 | End: 2020-07-24

## 2020-04-22 NOTE — TELEPHONE ENCOUNTER
Writer placed additional follow-up call this morning. Spoke with mom, Hilary. Hilayr reports that Carloz is doing well and the Abilify has worked out well. He is currently up to 30mg daily. Reviewed med tab with mom over the phone. Carloz continues to take prazosin and trazodone as prescribed. Hilary indicates that Carloz is in need of refills for the abilify, prazosin, and trazodone.     Writer [id number] [place] [place] [facility] [address] [facility] confirm that refill is on file for trazodone. Pharmacist reports it is ready and can be picked up. Will notify Hilary.    Writer will touch base with provider to determine plan re: Abilify and prazosin moving forward.

## 2020-06-10 ENCOUNTER — TELEPHONE (OUTPATIENT)
Dept: PSYCHIATRY | Facility: CLINIC | Age: 40
End: 2020-06-10

## 2020-06-10 NOTE — TELEPHONE ENCOUNTER
Writer called preferred number for scheduled telephone visit but was unable to reach. Detailed voicemail left with clinic phone number to reschedule visit.

## 2020-07-10 DIAGNOSIS — F84.0 AUTISM: ICD-10-CM

## 2020-07-13 RX ORDER — TRAZODONE HYDROCHLORIDE 50 MG/1
50 TABLET, FILM COATED ORAL AT BEDTIME
Qty: 30 TABLET | Refills: 0 | Status: SHIPPED | OUTPATIENT
Start: 2020-07-13 | End: 2020-07-24

## 2020-07-13 NOTE — TELEPHONE ENCOUNTER
Medication requested: traZODone (DESYREL) 50 MG tablet   Last refilled: 6/4/20  Qty: 30      Last seen: 3/18/20  RTC: 3 months  Cancel: 0  No-show: 1  Next appt: 0    Refill decision:   Refill pended and routed to the provider for review/determination due to   NO SHOW X 1  Scheduling has been notified to contact the pt for appointment.

## 2020-07-24 DIAGNOSIS — F84.0 AUTISM SPECTRUM DISORDER: ICD-10-CM

## 2020-07-24 DIAGNOSIS — F84.0 AUTISM: ICD-10-CM

## 2020-07-24 DIAGNOSIS — F43.10 PTSD (POST-TRAUMATIC STRESS DISORDER): ICD-10-CM

## 2020-07-24 RX ORDER — TRAZODONE HYDROCHLORIDE 50 MG/1
50 TABLET, FILM COATED ORAL AT BEDTIME
Qty: 30 TABLET | Refills: 0 | Status: SHIPPED | OUTPATIENT
Start: 2020-07-24 | End: 2020-09-17

## 2020-07-24 RX ORDER — PRAZOSIN HYDROCHLORIDE 2 MG/1
2 CAPSULE ORAL AT BEDTIME
Qty: 30 CAPSULE | Refills: 0 | Status: SHIPPED | OUTPATIENT
Start: 2020-07-24 | End: 2020-09-17

## 2020-07-24 RX ORDER — ARIPIPRAZOLE 30 MG/1
30 TABLET ORAL DAILY
Qty: 30 TABLET | Refills: 0 | Status: SHIPPED | OUTPATIENT
Start: 2020-07-24 | End: 2020-09-17

## 2020-07-24 NOTE — TELEPHONE ENCOUNTER
- Meds refilled by provider   - Med tab changed to reflect this    Writer placed a call to patient to update him regarding the refill. The phone was answered by mom, Zeny. Writer updated her that patient's medications were refilled at preferred pharmacy. She also agreed to schedule an appt with provider on 7/30 at 11:30 am for a phone visit. Scheduling notified.

## 2020-07-24 NOTE — TELEPHONE ENCOUNTER
FW: Refill Request   Received: Today   Message Contents   Cary Hampton, RN  Cary Hampton, RN    Phone Number: [phone]            Previous Messages     ----- Message -----   From: Elizabeth Diaz   Sent: 7/24/2020  10:50 AM CDT   To: [facility]   Subject: Refill Request                                   Caller: brooklyn Moura's mom   Medication:  Trazodone 50 mg/Prazosin 2 mg/Aripiprazole 30 mg   Pharmacy and location:  [facility]   Have you contacted the pharmacy: Yes   How much of medication do you have left:  Yes   Pending appt: No   Okay to leave VM:  Yes     Thanks!        Last seen: 3/18  RTC: 3 months   Cancel: none   No-show: 6/10  Next appt: none     Incoming refill from patient via phone     Medication requested: ARIPiprazole (ABILIFY) 30 MG tablet   Directions: Take 1 tablet (30 mg) by mouth daily - Oral   Qty: 30  Last refilled: 4/22    Medication requested: traZODone (DESYREL) 50 MG tablet   Directions: Take 1 tablet (50 mg) by mouth At Bedtime For more refills,schedule an appointment at [phone] - Oral   Qty: 30  Last refilled: 7/13    Medication requested: prazosin (MINIPRESS) 2 MG capsule   Directions: Take 1 capsule (2 mg) by mouth At Bedtime - Oral   Qty: 30  Last refilled: 4/22    Plan per last office visit:   1) MEDICATION:      - restart abilify 10 mg daily and increase by 10 every week until back to 30 mg PO Q Day      - continue trazodone 50 mg PO Q hS      - restart prazosin 1mg PO Q HS for one week, if tolerated increase to 2 mg PO Q HS    Will route to provider for approval due to no show on appt

## 2020-07-30 ENCOUNTER — TELEPHONE (OUTPATIENT)
Dept: PSYCHIATRY | Facility: CLINIC | Age: 40
End: 2020-07-30

## 2020-07-30 NOTE — TELEPHONE ENCOUNTER
Attempted to call pt's guardian at preferred number. Unable to reach. Message left to return call to reschedule appointment if necessary. Clinic number provided.

## 2020-09-17 DIAGNOSIS — F43.10 PTSD (POST-TRAUMATIC STRESS DISORDER): ICD-10-CM

## 2020-09-17 DIAGNOSIS — F84.0 AUTISM: ICD-10-CM

## 2020-09-17 DIAGNOSIS — F84.0 AUTISM SPECTRUM DISORDER: ICD-10-CM

## 2020-09-17 NOTE — TELEPHONE ENCOUNTER
[facility] Call Center    Phone Message    May a detailed message be left on voicemail: yes     Reason for Call: Medication Refill Request    Has the patient contacted the pharmacy for the refill? Yes   Name of medication being requested: All Meds  Provider who prescribed the medication: Delicia Diop  Pharmacy: [facility] on [address] in [facility]  Date medication is needed: ASAP. Pt has been out for about 4 days.          Action Taken: Other: [facility]    Travel Screening: Not Applicable

## 2020-09-17 NOTE — TELEPHONE ENCOUNTER
Received RF request from patient's Mom    Last seen: 3/18/20  RTC: 3 months  Cancel: none  No-show: two - 6/10, 7/30  Next appt: 10/7/20     Medication requested: ARIPiprazole (ABILIFY) 30 MG tablet   Directions: Take 1 tablet (30 mg) by mouth daily   Qty: 30  Last Rx written 7/24/30 for 30 ds  Last filled 7/24/20     Medication requested:prazosin (MINIPRESS) 2 MG capsule    Directions: Take 1 capsule (2 mg) by mouth At Bedtime   Qty: 30  Last Rx written 7/24/20 for 30 ds  Last filled 8/3/20    Medication requested: traZODone (DESYREL) 50 MG tablet   Directions: Take 1 tablet (50 mg) by mouth At Bedtime   Qty: 30  Last Rx written 7/24/20 for 30 ds  Last filled 8/8/20      Plan per 3/18/20  office visit:     - restart abilify 10 mg daily and increase by 10 every week until back to 30 mg PO Q Day      - continue trazodone 50 mg PO Q hS      - restart prazosin 1mg PO Q HS for one week, if tolerated increase to 2 mg PO Q HS       Per patient's Mom, he has been out of medications for four days.  Pended 30 ds with a note that patient must be seen for future refills and routed to DILSHAD Jones, covering for DILSHAD Conway, to review and authorize.

## 2020-09-18 RX ORDER — PRAZOSIN HYDROCHLORIDE 2 MG/1
2 CAPSULE ORAL AT BEDTIME
Qty: 20 CAPSULE | Refills: 0 | Status: SHIPPED | OUTPATIENT
Start: 2020-09-18 | End: 2020-10-07

## 2020-09-18 RX ORDER — ARIPIPRAZOLE 30 MG/1
30 TABLET ORAL DAILY
Qty: 20 TABLET | Refills: 0 | Status: SHIPPED | OUTPATIENT
Start: 2020-09-18 | End: 2020-10-07

## 2020-09-18 RX ORDER — TRAZODONE HYDROCHLORIDE 50 MG/1
50 TABLET, FILM COATED ORAL AT BEDTIME
Qty: 20 TABLET | Refills: 0 | Status: SHIPPED | OUTPATIENT
Start: 2020-09-18 | End: 2020-10-07

## 2020-09-18 NOTE — TELEPHONE ENCOUNTER
Enma Huston, Junie Palma, RN    Caller: Unspecified (Yesterday,  2:43 PM)               Irvin Zaman,     OK to continue Aripiprazole and Prazosin at same dose. Please only fill/send enough until next appointment on 10/7 with Dr. Cha.     Thanks,   EGS        Follow up:  - sent 20 ds of trazodone, prazosin, and aripiprazole to [facility] on [facility] in [place]  - attempted to call patient's Mom to let her know that the refill has been addressed. Home number listed for Mom not in service. The call was picked up at the mobile number and someone said hello and did not respond to writer's greeting. The call was then cut off.

## 2020-09-21 NOTE — TELEPHONE ENCOUNTER
Attempted to call patient's Mom at [phone] and received the same message as the previous attempt - the phone number is not in service.

## 2020-10-07 ENCOUNTER — VIRTUAL VISIT (OUTPATIENT)
Dept: PSYCHIATRY | Facility: CLINIC | Age: 40
End: 2020-10-07
Attending: NURSE PRACTITIONER
Payer: MEDICAID

## 2020-10-07 DIAGNOSIS — F43.10 PTSD (POST-TRAUMATIC STRESS DISORDER): ICD-10-CM

## 2020-10-07 DIAGNOSIS — F84.0 AUTISM: ICD-10-CM

## 2020-10-07 DIAGNOSIS — F84.0 AUTISM SPECTRUM DISORDER: Primary | ICD-10-CM

## 2020-10-07 PROCEDURE — 99442 PR PHYSICIAN TELEPHONE EVALUATION 11-20 MIN: CPT | Performed by: NURSE PRACTITIONER

## 2020-10-07 RX ORDER — TRAZODONE HYDROCHLORIDE 50 MG/1
50 TABLET, FILM COATED ORAL AT BEDTIME
Qty: 30 TABLET | Refills: 2 | Status: SHIPPED | OUTPATIENT
Start: 2020-10-07 | End: 2021-01-06

## 2020-10-07 RX ORDER — PRAZOSIN HYDROCHLORIDE 2 MG/1
2 CAPSULE ORAL AT BEDTIME
Qty: 30 CAPSULE | Refills: 2 | Status: SHIPPED | OUTPATIENT
Start: 2020-10-07 | End: 2021-01-06

## 2020-10-07 RX ORDER — ARIPIPRAZOLE 30 MG/1
30 TABLET ORAL DAILY
Qty: 20 TABLET | Refills: 2 | Status: SHIPPED | OUTPATIENT
Start: 2020-10-07 | End: 2021-01-06

## 2020-10-07 NOTE — PROGRESS NOTES
TELEPHONE VISIT  Carloz Drummond is a 40 year old pt. who is being evaluated via a billable telephone visit.      The patient has been notified of the following:    We have found that certain health care needs can be provided without the need for a physical exam. This service lets us provide the care you need with a short phone conversation. If a prescription is necessary we can send it directly to your pharmacy. If lab work is needed we can place an order for that and you can then stop by our lab to have the test done at a later time. Insurers are generally covering virtual visits as they would in-office visits so billing should not be different than normal.  If for some reason you do get billed incorrectly, you should contact the billing office to correct it and that number is in the AVS .    Patient has given verbal consent for a telephone visit?:  Yes   How would the pt like to obtain the AVS?:  Control[employer] SmartPhrase [PsychAVS] has been placed in 'Patient Instructions':  Yes     Start Time:  1:02          End Time:  1:16    PSYCHIATRY CLINIC PROGRESS NOTE    30 minute medication management   IDENTIFICATION: Carloz Drummond is a 40 year old male with previous psychiatric diagnoses of autism and PTSD. Pt was not present for appointment. Mother presents on patient's behalf for ongoing psychiatric follow-up and pt was seen for last transfer of care evaluation on 3/18/2020.  SUBJECTIVE / INTERIM HISTORY     The pt was last seen in clinic 3/18/2020 at which time all medications were restarted. The patient reports good medication adherence. Since the last visit, he has taken his medication daily as prescribed. Mom does not believe he is experiencing any side effects. He is continuing to do work through [employer]. 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 He has continued to experience difficulty urinating. Mom brought pt in to PCP who was reportedly not concerned by the symptoms. Lipids are monitored by  PCP who ordered a lab draw at that appointment.    SYMPTOMS include ongoing mood stability. Per Mom he has been following directions well. He is still active with [employer] and transitions between home and daily activities well. He has started to make a loud verbal outburst during these transition periods. Mom is unsure of the cause, it seems more self-stimulating than out of discomfort. She reports that overall it is tolerable. He has not been aggressive with her or at work. She denies any current concerns for safety.     Current Substance Use- parent denies. Sober support- na     MEDICAL ROS          Reports A comprehensive review of systems was performed and is negative other than noted in the HPI.    PAST MEDICATION TRIALS    Naltrexone, unsure of effectiveness  Hydroxyzine, decreased response over time  risperidone  MEDICAL HISTORY      Primary Care Physician: Clinic, [facility] at [address] [place], [place] [id number]     Neurologic Hx:  head injury- denies     seizure- denies      LOC- denies    other- na   Patient Active Problem List   Diagnosis     Autism     ALLERGY     No Known Allergies    MEDICATIONS      Current Outpatient Medications   Medication Sig     ARIPiprazole (ABILIFY) 30 MG tablet Take 1 tablet (30 mg) by mouth daily Refills can be obtained at next appointment.     lisinopril-hydrochlorothiazide (PRINZIDE/ZESTORETIC) 10-12.5 MG tablet Take 1 tablet by mouth daily     prazosin (MINIPRESS) 2 MG capsule Take 1 capsule (2 mg) by mouth At Bedtime Refills can be obtained at next appointment.     traZODone (DESYREL) 50 MG tablet Take 1 tablet (50 mg) by mouth At Bedtime Refills can be obtained at next appointment.     No current facility-administered medications for this visit.        Drug Interaction Check is remarkable for:  None  VITALS    There were no vitals taken for this visit.  LABS  use PSYCHLAB______       No results found for any previous visit.       MENTAL STATUS EXAM      Pt not present    PSYCHOLOGICAL TESTING:     none    ASSESSMENT     Carloz Drummond is a 40 year old male with psychiatric diagnoses of autism and PTSD. He was not present for the appointment. Mother reported that things are going well and mood and behavior is stable. She would like to keep medications the same. Discussed urinary retention. Mother reported that she had Carty evaluated by PCP who was not concerned. Will continue to monitor. No medication changes. Follow-up in 3 months. Mother informed that she may call with any questions, concerns, or if she feels an earlier appointment is necessary.       TREATMENT RISK STATEMENT:  The risks, benefits, alternatives and potential adverse effects have been explained and are understood by the pt and pt's parent(s)/guardian.  Discussion of specific concerns included- N/A. The  pt and pt's parent(s)/guardian agrees to the treatment plan with the ability to do so. The  pt and pt's parent(s)/guardian knows to call the clinic for any problems or access emergency care if needed. There are no medical considerations relevant to treatment, as noted above. Substance use is not a problem as noted above.      Drug interaction check was done for any med changes and is discussed above.      DIAGNOSES                                                                                                      Encounter Diagnoses   Name Primary?     Autism spectrum disorder Yes     PTSD (post-traumatic stress disorder)                                    PLAN                                                                                                 Medication Plan:         -- Continue Abilify 30 mg PO Q Day   Sent with 2 refills       -- continue trazodone 50 mg PO Q HS   Sent with 2 refills       -- continue prazosin 2 mg PO Q HS   Sent with 2 refills    Labs:  none    Pt monitor [call for probs]: nothing specific needed    THERAPY: No Change    REFERRALS [CD, medical, other]:   none    :  none    Controlled Substance Contract was not completed    RTC: 3 months    CRISIS NUMBERS: Provided in AVS upon request of patient/guardian.

## 2021-01-06 ENCOUNTER — VIRTUAL VISIT (OUTPATIENT)
Dept: PSYCHIATRY | Facility: CLINIC | Age: 41
End: 2021-01-06
Attending: NURSE PRACTITIONER
Payer: MEDICAID

## 2021-01-06 DIAGNOSIS — F84.0 AUTISM SPECTRUM DISORDER: ICD-10-CM

## 2021-01-06 DIAGNOSIS — F43.10 PTSD (POST-TRAUMATIC STRESS DISORDER): ICD-10-CM

## 2021-01-06 DIAGNOSIS — F84.0 AUTISM: ICD-10-CM

## 2021-01-06 PROCEDURE — 99441 PR PHYSICIAN TELEPHONE EVALUATION 5-10 MIN: CPT | Performed by: NURSE PRACTITIONER

## 2021-01-06 RX ORDER — ARIPIPRAZOLE 30 MG/1
30 TABLET ORAL DAILY
Qty: 20 TABLET | Refills: 2 | Status: SHIPPED | OUTPATIENT
Start: 2021-01-06 | End: 2021-04-01

## 2021-01-06 RX ORDER — PRAZOSIN HYDROCHLORIDE 2 MG/1
2 CAPSULE ORAL AT BEDTIME
Qty: 30 CAPSULE | Refills: 2 | Status: SHIPPED | OUTPATIENT
Start: 2021-01-06 | End: 2021-04-01

## 2021-01-06 RX ORDER — TRAZODONE HYDROCHLORIDE 50 MG/1
50 TABLET, FILM COATED ORAL AT BEDTIME
Qty: 30 TABLET | Refills: 2 | Status: SHIPPED | OUTPATIENT
Start: 2021-01-06 | End: 2021-04-01

## 2021-01-06 NOTE — PROGRESS NOTES
TELEPHONE VISIT  Carloz Drummond is a 40 year old pt. who is being evaluated via a billable telephone visit.      The patient has been notified of the following:    We have found that certain health care needs can be provided without the need for a physical exam. This service lets us provide the care you need with a short phone conversation. If a prescription is necessary we can send it directly to your pharmacy. If lab work is needed we can place an order for that and you can then stop by our lab to have the test done at a later time. Insurers are generally covering virtual visits as they would in-office visits so billing should not be different than normal.  If for some reason you do get billed incorrectly, you should contact the billing office to correct it and that number is in the AVS .    Patient has given verbal consent for a telephone visit?:  Yes   How would the pt like to obtain the AVS?:  Patient declined  AVS SmartPhrase [PsychAVS] has been placed in 'Patient Instructions':  Yes     Start Time:  2:32          End Time:  2:41      PSYCHIATRY CLINIC PROGRESS NOTE    30 minute medication management   IDENTIFICATION: Carloz Drummond is a 40 year old male with previous psychiatric diagnoses of autism and PTSD. Pt and mother present on patient's behalf for ongoing psychiatric follow-up and pt was seen for last transfer of care evaluation on 3/18/2020.  SUBJECTIVE / INTERIM HISTORY     The pt was last seen in clinic 10/7/2020 at which time no medication changes were made. The patient reports good medication adherence. Since the last visit, he has been taking his medication daily as prescribed. Mother oversees medication administration. She denies any known side effects of the medication.    SYMPTOMS include ongoing mood stability. Per Mom he has been getting along with family well. 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 He is  transitioning well between activities. Sleep continues to be stable. Carloz could be heard in the background calmly speaking with his sister. Though he was unable to answer questions and mother answered on his behalf. She denies any current concerns for safety.     Current Substance Use- parent denies. Sober support- na     MEDICAL ROS          Reports A comprehensive review of systems was performed and is negative other than noted in the HPI.    PAST MEDICATION TRIALS    Naltrexone, unsure of effectiveness  Hydroxyzine, decreased response over time  risperidone  MEDICAL HISTORY      Primary Care Physician: Clinic, [facility] at [address] [place], [place] [id number]     Neurologic Hx:  head injury- denies     seizure- denies      LOC- denies    other- na    Patient Active Problem List   Diagnosis     Autism     ALLERGY     No Known Allergies    MEDICATIONS      Current Outpatient Medications   Medication Sig     ARIPiprazole (ABILIFY) 30 MG tablet Take 1 tablet (30 mg) by mouth daily     prazosin (MINIPRESS) 2 MG capsule Take 1 capsule (2 mg) by mouth At Bedtime .     traZODone (DESYREL) 50 MG tablet Take 1 tablet (50 mg) by mouth At Bedtime     lisinopril-hydrochlorothiazide (PRINZIDE/ZESTORETIC) 10-12.5 MG tablet Take 1 tablet by mouth daily     No current facility-administered medications for this visit.        Drug Interaction Check is remarkable for:  None  VITALS    There were no vitals taken for this visit.  LABS  use PSYCHLAB______       No results found for any previous visit.       MENTAL STATUS EXAM     Alertness: alert  and oriented  Appearance: unable to assess by telephone  Behavior/Demeanor: unable to assess by telephone,  Speech: 1-2 word phrases, short  Language: intact and limited vocabulary  Psychomotor: unable to assess by telephone  Mood:  description consistent with euthymia  Affect: unable to assess by telephone;   Thought Process/Associations: perseverative by parent  report  Thought Content: no observable suicidal or homicidal ideation  Perception: no  Observable hallucinations  Insight: poor  Judgment: limited  Cognition: does not appear grossly intact; formal cognitive testing was not done     PSYCHOLOGICAL TESTING:     none    ASSESSMENT     Carloz Drummond is a 40 year old male with psychiatric diagnoses of autism and PTSD. He was not able to engage in conversation on the phone but was present and could be heard in the background speaking calmly with his sister. Mother answered questions on pt's behalf. She reports ongoing stability. She would like to keep his medications the same. Follow-up in 3 months. Mother informed that she may call with any questions, concerns, or if she feels an earlier appointment is necessary.       TREATMENT RISK STATEMENT:  The risks, benefits, alternatives and potential adverse effects have been explained and are understood by the pt and pt's parent(s)/guardian.  Discussion of specific concerns included- N/A. The  pt and pt's parent(s)/guardian agrees to the treatment plan with the ability to do so. The  pt and pt's parent(s)/guardian knows to call the clinic for any problems or access emergency care if needed. There are no medical considerations relevant to treatment, as noted above. Substance use is not a problem as noted above.      Drug interaction check was done for any med changes and is discussed above.      DIAGNOSES                                                                                                      Encounter Diagnoses   Name Primary?     Autism spectrum disorder      PTSD (post-traumatic stress disorder)      Autism                                    PLAN                                                                                                 Medication Plan:         -- Continue Abilify 30 mg PO Q Day                 Sent with 2 refills       -- continue trazodone 50 mg PO Q HS                 Sent with 2  refills       -- continue prazosin 2 mg PO Q HS                 Sent with 2 refills    Labs:  none    Pt monitor [call for probs]: nothing specific needed    THERAPY: No Change    REFERRALS [CD, medical, other]:  none    :  none    Controlled Substance Contract was not completed    RTC: 3 months    CRISIS NUMBERS: Provided in AVS upon request of patient/guardian.

## 2021-04-01 ENCOUNTER — TELEPHONE (OUTPATIENT)
Dept: PSYCHIATRY | Facility: CLINIC | Age: 41
End: 2021-04-01

## 2021-04-01 ENCOUNTER — VIRTUAL VISIT (OUTPATIENT)
Dept: PSYCHIATRY | Facility: CLINIC | Age: 41
End: 2021-04-01
Attending: NURSE PRACTITIONER
Payer: MEDICAID

## 2021-04-01 DIAGNOSIS — F43.10 PTSD (POST-TRAUMATIC STRESS DISORDER): ICD-10-CM

## 2021-04-01 DIAGNOSIS — F84.0 AUTISM SPECTRUM DISORDER: ICD-10-CM

## 2021-04-01 DIAGNOSIS — F84.0 AUTISM: ICD-10-CM

## 2021-04-01 PROCEDURE — 99442 PR PHYSICIAN TELEPHONE EVALUATION 11-20 MIN: CPT | Performed by: NURSE PRACTITIONER

## 2021-04-01 RX ORDER — ARIPIPRAZOLE 30 MG/1
30 TABLET ORAL DAILY
Qty: 20 TABLET | Refills: 2 | Status: SHIPPED | OUTPATIENT
Start: 2021-04-01 | End: 2021-06-28

## 2021-04-01 RX ORDER — TRAZODONE HYDROCHLORIDE 50 MG/1
50 TABLET, FILM COATED ORAL AT BEDTIME
Qty: 30 TABLET | Refills: 2 | Status: SHIPPED | OUTPATIENT
Start: 2021-04-01 | End: 2021-06-28

## 2021-04-01 RX ORDER — PRAZOSIN HYDROCHLORIDE 2 MG/1
2 CAPSULE ORAL AT BEDTIME
Qty: 30 CAPSULE | Refills: 2 | Status: SHIPPED | OUTPATIENT
Start: 2021-04-01 | End: 2021-06-28

## 2021-04-01 NOTE — TELEPHONE ENCOUNTER
[facility] Call Center    Phone Message    May a detailed message be left on voicemail: yes     Reason for Call: Other: Pt brother returning call from [place]. Can be reached at the number provided.      Action Taken: Other: Sent to Junie AGARWAL    Travel Screening: Not Applicable

## 2021-04-01 NOTE — PROGRESS NOTES
TELEPHONE VISIT  Carloz Drummond is a 41 year old pt. who is being evaluated via a billable telephone visit.      The patient has been notified of the following:    We have found that certain health care needs can be provided without the need for a physical exam. This service lets us provide the care you need with a short phone conversation. If a prescription is necessary we can send it directly to your pharmacy. If lab work is needed we can place an order for that and you can then stop by our lab to have the test done at a later time. Insurers are generally covering virtual visits as they would in-office visits so billing should not be different than normal.  If for some reason you do get billed incorrectly, you should contact the billing office to correct it and that number is in the AVS .    Patient has given verbal consent for a telephone visit?:  Yes   How would the pt like to obtain the AVS?:  Patient declined  AVS SmartPhrase [PsychAVS] has been placed in 'Patient Instructions':  Yes     Start Time:  10:32          End Time:  10:46    PSYCHIATRY CLINIC PROGRESS NOTE    30 minute medication management   IDENTIFICATION: Carloz Drummond is a 41 year old male with previous psychiatric diagnoses of autism and PTSD. Pt was not present. His mother and former guardian has passed away. His sister (and current guardian by her account) presents for ongoing psychiatric follow-up. Carloz was seen for last transfer of care evaluation on 3/18/2020.  SUBJECTIVE / INTERIM HISTORY     The pt was last seen in clinic 1/6/2021 at which time no medication changes were made. The patient reports good medication adherence. Since the last visit, Carloz's mother has passed away suddenly. He is staying with his sister now. They held services for her yesterday. Per Sister, Carloz has continued to take his medication regularly. She has not noticed any side effects from the medication.     SYMPTOMS include ongoing overall stability. However  he has been observed to grieve the passing of his mother. His work program notified sister of this. Per sister, he has tolerated moving in with her relatively well. She denies any current concerns for safety.     Current Substance Use- denies. Sober support- na     MEDICAL ROS          Reports A comprehensive review of systems was performed and is negative other than noted in the HPI.    PAST MEDICATION TRIALS    Naltrexone, unsure of effectiveness  Hydroxyzine, decreased response over time  risperidone  MEDICAL HISTORY      Primary Care Physician: [facility], [facility] at [address] [place] [address]     Neurologic Hx:  head injury- none     seizure- none      LOC- none    other- na   Patient Active Problem List   Diagnosis     Autism     ALLERGY     No Known Allergies    MEDICATIONS      Current Outpatient Medications   Medication Sig     ARIPiprazole (ABILIFY) 30 MG tablet Take 1 tablet (30 mg) by mouth daily     prazosin (MINIPRESS) 2 MG capsule Take 1 capsule (2 mg) by mouth At Bedtime .     traZODone (DESYREL) 50 MG tablet Take 1 tablet (50 mg) by mouth At Bedtime     lisinopril-hydrochlorothiazide (PRINZIDE/ZESTORETIC) 10-12.5 MG tablet Take 1 tablet by mouth daily     No current facility-administered medications for this visit.        Drug Interaction Check is remarkable for:  none  VITALS    There were no vitals taken for this visit.  LABS  use PSYCHLAB______       No results found for any previous visit.       MENTAL STATUS EXAM     Pt not present    PSYCHOLOGICAL TESTING:     none    ASSESSMENT     Carloz Drummond is a 41 year old male with psychiatric diagnoses of autism and PTSD. He was not present today but sister gave update. She states that she is working on finalizing paperwork to have primary guardianship of Carloz now that their mother has passed. Provided clinic number so that she may reach out to SW team if she needs assistance. Also, provided education to send updated  legal documents when finalized for guardianship. No medication changes at this time. Follow-up in 3 months.      TREATMENT RISK STATEMENT:  The risks, benefits, alternatives and potential adverse effects have been explained and are understood by the pt and pt's parent(s)/guardian.  Discussion of specific concerns included- N/A. The  pt and pt's parent(s)/guardian agrees to the treatment plan with the ability to do so. The  pt and pt's parent(s)/guardian knows to call the clinic for any problems or access emergency care if needed. There are no medical considerations relevant to treatment, as noted above. Substance use is not a problem as noted above.      Drug interaction check was done for any med changes and is discussed above.      DIAGNOSES                                                                                                      Encounter Diagnoses   Name Primary?     Autism spectrum disorder      PTSD (post-traumatic stress disorder)      Autism                                  PLAN                                                                                                 Medication Plan:         -- Continue Abilify 30 mg PO Q Day                 Sent with 2 refills       -- continue trazodone 50 mg PO Q HS                 Sent with 2 refills       -- continue prazosin 2 mg PO Q HS                 Sent with 2 refills    Labs:  none    Pt monitor [call for probs]: nothing specific needed    THERAPY: No Change    REFERRALS [CD, medical, other]:  none    :  none    Controlled Substance Contract was not completed    RTC: 3 months    CRISIS NUMBERS: Provided in AVS upon request of patient/guardian.

## 2021-04-01 NOTE — TELEPHONE ENCOUNTER
Called Ambrosio Marina to let him know that Paulette did not call and she was able to get an update on Carloz. He said that he is calling because Sylvia told him that they need Carty's insurance information for them to fill his prescriptions that were sent today. He was previously filling at [facility] when he lived with their Mom, but Mom passed away on March 5, 2021. Ambrosio Marina does not have this information.    Called the pharmacist and gave the member ID for Medicaid. He was able to get insurance coverage updated and the prescriptions will be ready for  tomorrow.     Called patient's brother back to let him know the issue has been resolved. He expressed appreciation.

## 2021-06-28 ENCOUNTER — VIRTUAL VISIT (OUTPATIENT)
Dept: PSYCHIATRY | Facility: CLINIC | Age: 41
End: 2021-06-28
Attending: NURSE PRACTITIONER
Payer: MEDICAID

## 2021-06-28 DIAGNOSIS — F84.0 AUTISM: ICD-10-CM

## 2021-06-28 DIAGNOSIS — F84.0 AUTISM SPECTRUM DISORDER: ICD-10-CM

## 2021-06-28 DIAGNOSIS — F43.10 PTSD (POST-TRAUMATIC STRESS DISORDER): ICD-10-CM

## 2021-06-28 PROCEDURE — 99443 PR PHYSICIAN TELEPHONE EVALUATION 21-30 MIN: CPT | Performed by: NURSE PRACTITIONER

## 2021-06-28 RX ORDER — ARIPIPRAZOLE 30 MG/1
30 TABLET ORAL DAILY
Qty: 30 TABLET | Refills: 2 | Status: SHIPPED | OUTPATIENT
Start: 2021-06-28 | End: 2021-09-08

## 2021-06-28 RX ORDER — TRAZODONE HYDROCHLORIDE 50 MG/1
75 TABLET, FILM COATED ORAL AT BEDTIME
Qty: 45 TABLET | Refills: 2 | Status: SHIPPED | OUTPATIENT
Start: 2021-06-28 | End: 2021-08-05

## 2021-06-28 RX ORDER — PRAZOSIN HYDROCHLORIDE 2 MG/1
2 CAPSULE ORAL AT BEDTIME
Qty: 30 CAPSULE | Refills: 2 | Status: SHIPPED | OUTPATIENT
Start: 2021-06-28 | End: 2021-08-05

## 2021-06-28 NOTE — PROGRESS NOTES
TELEPHONE VISIT  Carloz Drummond is a 41 year old pt. who is being evaluated via a billable telephone visit.      The patient has been notified of the following:    We have found that certain health care needs can be provided without the need for a physical exam. This service lets us provide the care you need with a short phone conversation. If a prescription is necessary we can send it directly to your pharmacy. If lab work is needed we can place an order for that and you can then stop by our lab to have the test done at a later time. Insurers are generally covering virtual visits as they would in-office visits so billing should not be different than normal.  If for some reason you do get billed incorrectly, you should contact the billing office to correct it and that number is in the AVS .    Patient has given verbal consent for a telephone visit?:  Yes   How would the pt like to obtain the AVS?:  Patient declined  AVS SmartPhrase [PsychAVS] has been placed in 'Patient Instructions':  Yes     Start Time:  2:08          End Time:  2:34  PSYCHIATRY CLINIC PROGRESS NOTE    30 minute medication management   IDENTIFICATION: Carloz Drummond is a 41 year old male with previous psychiatric diagnoses of autism and PTSD. Pt and sisterr present for ongoing psychiatric follow-up and pt was seen for last transfer of care evaluation on 3/18/2020.  SUBJECTIVE / INTERIM HISTORY     The pt was last seen in clinic 4/6/2021 at which time no medication changes were made. The patient reports good medication adherence. Since the last visit, he continues to live with his sister. Sister has been using the playing cards to help him use language and follow direction. He continues to struggle to urinate. It is taking longer than before. Sister plans to bring him in to PCP for follow-up.    SYMPTOMS include overall improvement in mood and self-advocacy and speech. Sleep is disrupted. He seems to be having a harder time falling asleep. Then  "he is napping during the day because he is tired. He is talking more about his Mom and seems to be working through this loss. He is repeating language that his mother used to say to him and looks at her picture often in their home. Sometimes he is tearful. Sister denies any safety concerns. Carloz reports he is doing \"good\".     Current Substance Use- none. Sober support- na     MEDICAL ROS          Reports A comprehensive review of systems was performed and is negative other than noted in the HPI.     PAST MEDICATION TRIALS    Naltrexone, unsure of effectiveness  Hydroxyzine, decreased response over time  risperidone  MEDICAL HISTORY      Primary Care Physician: Clinic, [facility] at [address] [place] [address]     Neurologic Hx:  head injury- none     seizure- none      LOC- none    other- na   Patient Active Problem List   Diagnosis     Autism     ALLERGY     No Known Allergies    MEDICATIONS      Current Outpatient Medications   Medication Sig     ARIPiprazole (ABILIFY) 30 MG tablet Take 1 tablet (30 mg) by mouth daily     prazosin (MINIPRESS) 2 MG capsule Take 1 capsule (2 mg) by mouth At Bedtime .     traZODone (DESYREL) 50 MG tablet Take 1.5 tablets (75 mg) by mouth At Bedtime     lisinopril-hydrochlorothiazide (PRINZIDE/ZESTORETIC) 10-12.5 MG tablet Take 1 tablet by mouth daily     No current facility-administered medications for this visit.        Drug Interaction Check is remarkable for:  None  VITALS    There were no vitals taken for this visit.  LABS  use PSYCHLAB______       No results found for any previous visit.       MENTAL STATUS EXAM     Alertness: alert  and oriented  Appearance: unable to assess by telephone  Behavior/Demeanor: unable to assess by telephone,  Speech: 1-2 word phrases, short,some echolalia noted  Language: intact and limited vocabulary  Psychomotor: unable to assess by telephone  Mood:  description consistent with euthymia  Affect: unable to assess by " telephone;   Thought Process/Associations: perseverative by parent report  Thought Content: no observable suicidal or homicidal ideation  Perception: no  Observable hallucinations  Insight: poor  Judgment: limited  Cognition: does not appear grossly intact; formal cognitive testing was not done      PSYCHOLOGICAL TESTING:     none    ASSESSMENT     Carloz Drummond is a 41 year old male with psychiatric diagnoses of autism and PTSD. He was minimally engaged in the phone call but did answer some questions today. He could be heard int he background throughout the appointment, sometimes repeating what his sister said. Sister denies any concerns for safety. She thinks the current medication plan is helpful though notes that sleep initiation has worsened recently. Plan made to increase trazodone to 75 mg PO Q HS. Education provided on ruling out medical concerns related to urinary retention. Sister plans to call Carloz's PCP to schedule another appointment. He is likely due for an annual exam anyway. Discussed that if physical cause of urinary retention is ruled out, it is possible that this is a side effect of the abilify. Will follow-up in 4 weeks.       TREATMENT RISK STATEMENT:  The risks, benefits, alternatives and potential adverse effects have been explained and are understood by the pt and pt's parent(s)/guardian.  Discussion of specific concerns included- N/A. The  pt and pt's parent(s)/guardian agrees to the treatment plan with the ability to do so. The  pt and pt's parent(s)/guardian knows to call the clinic for any problems or access emergency care if needed. There are no medical considerations relevant to treatment, as noted above. Substance use is not a problem as noted above.      Drug interaction check was done for any med changes and is discussed above.      DIAGNOSES                                                                                                      Encounter Diagnoses   Name Primary?      Autism      PTSD (post-traumatic stress disorder)      Autism spectrum disorder                                    PLAN                                                                                                 Medication Plan:         -- increase trazodone to 75 mg PO Q HS   Sent with 2 refills       -- Continue Abilify 30 mg PO Q Day                 Sent with 2 refills       -- continue prazosin 2 mg PO Q HS                 Sent with 2 refills       Labs:  none    Pt monitor [call for probs]: nothing specific needed    THERAPY: No Change    REFERRALS [CD, medical, other]:  none    :  none    Controlled Substance Contract was not completed    RTC: 4 weeks    CRISIS NUMBERS: Provided in AVS upon request of patient/guardian.

## 2021-08-03 ENCOUNTER — VIRTUAL VISIT (OUTPATIENT)
Dept: PSYCHIATRY | Facility: CLINIC | Age: 41
End: 2021-08-03
Attending: NURSE PRACTITIONER
Payer: MEDICAID

## 2021-08-03 DIAGNOSIS — F43.10 PTSD (POST-TRAUMATIC STRESS DISORDER): Primary | ICD-10-CM

## 2021-08-03 DIAGNOSIS — F84.0 AUTISM: ICD-10-CM

## 2021-08-03 PROCEDURE — 99443 PR PHYSICIAN TELEPHONE EVALUATION 21-30 MIN: CPT | Performed by: NURSE PRACTITIONER
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
## 2021-08-05 ENCOUNTER — TELEPHONE (OUTPATIENT)
Dept: PSYCHIATRY | Facility: CLINIC | Age: 41
End: 2021-08-05

## 2021-08-05 DIAGNOSIS — F43.10 PTSD (POST-TRAUMATIC STRESS DISORDER): ICD-10-CM

## 2021-08-05 DIAGNOSIS — F84.0 AUTISM SPECTRUM DISORDER: ICD-10-CM

## 2021-08-05 DIAGNOSIS — F84.0 AUTISM: ICD-10-CM

## 2021-08-05 RX ORDER — ARIPIPRAZOLE 30 MG/1
30 TABLET ORAL DAILY
Qty: 90 TABLET | Refills: 0 | Status: CANCELLED | OUTPATIENT
Start: 2021-08-05

## 2021-08-05 RX ORDER — PRAZOSIN HYDROCHLORIDE 2 MG/1
2 CAPSULE ORAL AT BEDTIME
Qty: 90 CAPSULE | Refills: 0 | Status: SHIPPED | OUTPATIENT
Start: 2021-08-05 | End: 2021-10-28

## 2021-08-05 RX ORDER — TRAZODONE HYDROCHLORIDE 50 MG/1
75 TABLET, FILM COATED ORAL AT BEDTIME
Qty: 135 TABLET | Refills: 0 | Status: SHIPPED | OUTPATIENT
Start: 2021-08-05 | End: 2021-10-28

## 2021-08-05 NOTE — CONFIDENTIAL NOTE
"SW referral from Paulette Diop:  This family needs a lot of help. I am not totally sure if the paperwork/legal documents are actually fully figured out but sister Ambrosio Marina is now the primary caretaker for Carloz since their mother has passed away. She is struggling to navigate all of this. Could anyone call her and just help piece some things together for her? She says he has a  who is looking into respite. Though, I am not sure what type of  this is. I do believe Carloz also gets some form of social security. Sister is wondering if he could get more in food support, possibly money for clothing/new shoes. She is also wondering about supports for herself, specifically therapy. But I wonder about some support groups too. She is also struggling to figure out who Carloz's PCP is and how to get him set back up for an appointment-he is very overdue. I think she is overwhelmed to be honest and may need help actually calling and setting up these services as well. Would any of you have time to help work on this for me?      SW placed call to sister, Ambrosio Marina.   Writer first addressed guardianship needs. Carloz's mother was his guardian until her unexpected passing. She often verbalized wishes for Ambrosio Marina to take over guardianship when she  and to care for him. \"They\" were supposed to send out paperwork, but she doesn't think it should take this long. Ambrosio Marina had difficult identifying who was going to send out.    Biggest current concern is housing. Carloz used to live in a 1 bedroom with his mother and another brother. Based on information shared by Ambrosio Marina, it does not appear that they received subsidies. They are currently staying in her grandmother's place. Her grandmother also recently passed. The landlord has been supportive and understanding, but she is unsure how long they will be allowed to stay. Per sister, Carloz has been assigned a housing worker, but they rarely connect " with Ambrosio Salas and have not listened to her needs. Ambrosio Salas is hoping to find a single family home as Carloz can often make loud noises and be up at night. Per info from provider, Carloz also has a habit of ripping up paper when he finds it, including mail. Housing would be for Carloz, his sister, and her daughter.    Carloz does have a , Joel. He appears to be helping with some of the paperwork needs. Ambrosio Marina gave writer phone number to Joel: [phone] and provided verbal permission to coordinate services. Per provider and sister, Carloz does not appear to have capacity to provide consent for coordination.    Writer made plans to call Ambrosio Marina in 1 week to follow up on progress and identify next steps.    Writer left message for Joel, requesting coordination of care and providing phone number.    YESI Villalobos, [facility]  [phone]

## 2021-08-05 NOTE — TELEPHONE ENCOUNTER
Delicia Diop NP Kaiser-Schatzlein, Sarah, RN  It can wait until Thursday.     Thanks!   Paulette       ----- Message -----   From: Amaya Hernandez RN   Sent: 8/3/2021   5:25 PM CDT   To: Delicia Diop NP   Subject: RE: switch prescriptions over to 90 day supp*     Irvin Caldwell,     This makes sense.  I will call on Thursday when I am back in office.  If you think it needs to be done Wednesday, let Junie know, please.     Thank you, Amaya   ----- Message -----   From: Delicia Diop NP   Sent: 8/3/2021   4:15 PM CDT   To: Amaya Hernandez RN   Subject: switch prescriptions over to 90 day supplies     Hi,     I am struggling to figure out what is happening with this pharmacy. Per sister, he has run out in the past (when he should not have-refills were sent). I am hoping now to switch his prescriptions over to 90 day supplies but sister thought that they may have dispensed some of his meds as 90 days already but not all of them. Sister has recently and unexpectedly become his primary care taker after their mom has passed away. Would you have time to call the pharmacy tomorrow and figure out what has been dispensed so that I can be sure to send the next refill on time and in 90 day supply? I hope that makes sense! Let me know if you have any questions!     Paulette

## 2021-08-05 NOTE — TELEPHONE ENCOUNTER
Irvin Conde,    We will meet monthly for a while. I think we can leave off the aripiprazole for now. Thanks for looking into all of this for me!    Paulette

## 2021-08-05 NOTE — TELEPHONE ENCOUNTER
Writer called pharmacy to confirm when patient had last filled his medications and at what quantity.    Patient filled trazodone and prazosin for 30 d/s on 7/23 and aripiprazole for 90 d/s on 7/18.    Pended refills for 90 d/s but will route to provider for confirmation of dosing and RTC plan.   medications

## 2021-08-05 NOTE — TELEPHONE ENCOUNTER
Will do!  Thanks,  Kirsten is working with this family now too so my hope is that we are making things easier for them!  Paulette

## 2021-08-13 ENCOUNTER — TELEPHONE (OUTPATIENT)
Dept: PSYCHIATRY | Facility: CLINIC | Age: 41
End: 2021-08-13

## 2021-08-13 NOTE — CONFIDENTIAL NOTE
SW called patient's sister to follow up on call regarding guardianship and housing needs.    Writer updated that she has been exchanging voicemails with Carty's  but has been unable to talk directly with him.    Sister recently toured a potential rental home, but does not think they will be able to afford it due to requirement of needing 3x income. She reports that Carloz is eligible for some housing assistance through his waiver, but she is unsure if this is 1 time support or monthly support. Write will follow up with . Writer also checked on possibility of subsidy. It appear her grandmother may have had a Section 8 voucher, however her niece was living with grandmother so it appears that she may try to get voucher transferred to her. Ambrosio Marina also provided number of housing working: [facility] [phone] and provided verbal permission for writer to check in with him.    Ambrosio Marina has not made progress on guardianship paperwork. Another family member has offered to help her but continually changes plans to come over to help.    Writer will follow up in 2 weeks to check on progress.    Aria Mckoy, YESI, [facility]  [phone]

## 2021-08-16 NOTE — CONFIDENTIAL NOTE
SW able to talk with benja Maldonado  by phone.     Discussed:  -guardianship: Joel reaffirmed mom's wishes that sister take over guardianship, but no formal paperwork was completed prior to mom's death. Joel has provided list of resources that can help with guardianship but Ambrosio Marina has not been able to follow up with them. Joel will email writer list and writer charly take on primary responsibility to assist Ambrosio Marina in connecting to /support services in regards to guardianship.  -housing. Joel set up family with a housing worker and is getting regular updates. There is not monthly housing financial support available, but a 1 time support that can help with moving expenses and buying supplies to set up living situation. At times, this can provide help with damage deposit, it cannot provide help with rent.   -other supports. Carloz is going to a day program 5 times per week. He used to have 1:1 support for several hours after work. This worker fell through due to family concerns about the worker. Joel helped Carloz's sister get connected to an agency so that she can be paid to provide daily support to Carloz. Intent is not for this to be long term, but during current pandemic and to help allow Ambrosio Marina to get Carloz's needs met.  -Social Security. Mom was Carloz's rep payee. There may be questions as to who is helping Carloz manage social security check.    Joel and agreed to coordinate with writer to assist family in getting needs met.    Aria Mckoy, YESI, [facility]  [phone]

## 2021-09-08 ENCOUNTER — VIRTUAL VISIT (OUTPATIENT)
Dept: PSYCHIATRY | Facility: CLINIC | Age: 41
End: 2021-09-08
Attending: NURSE PRACTITIONER
Payer: MEDICAID

## 2021-09-08 ENCOUNTER — TELEPHONE (OUTPATIENT)
Dept: PSYCHIATRY | Facility: CLINIC | Age: 41
End: 2021-09-08

## 2021-09-08 DIAGNOSIS — F84.0 AUTISM SPECTRUM DISORDER: ICD-10-CM

## 2021-09-08 DIAGNOSIS — F84.0 AUTISM: ICD-10-CM

## 2021-09-08 DIAGNOSIS — F43.10 PTSD (POST-TRAUMATIC STRESS DISORDER): Primary | ICD-10-CM

## 2021-09-08 PROCEDURE — 99442 PR PHYSICIAN TELEPHONE EVALUATION 11-20 MIN: CPT | Performed by: NURSE PRACTITIONER

## 2021-09-08 RX ORDER — ARIPIPRAZOLE 30 MG/1
30 TABLET ORAL DAILY
Qty: 90 TABLET | Refills: 0 | Status: SHIPPED | OUTPATIENT
Start: 2021-09-28 | End: 2021-12-29

## 2021-09-08 NOTE — TELEPHONE ENCOUNTER
On September 8, 2021, at 2:07 PM, writer called patient at [phone] to confirm Virtual Visit. Writer unable to make contact with patient. Writer left detailed voice message for call back. [phone] left as call back number. Faith Medina, [facility]

## 2021-09-08 NOTE — PROGRESS NOTES
TELEPHONE VISIT  Carloz Drummond is a 41 year old pt. who is being evaluated via a billable telephone visit.      The patient has been notified of the following:    We have found that certain health care needs can be provided without the need for a physical exam. This service lets us provide the care you need with a short phone conversation. If a prescription is necessary we can send it directly to your pharmacy. If lab work is needed we can place an order for that and you can then stop by our lab to have the test done at a later time. Insurers are generally covering virtual visits as they would in-office visits so billing should not be different than normal.  If for some reason you do get billed incorrectly, you should contact the billing office to correct it and that number is in the AVS .    Patient has given verbal consent for a telephone visit?:  Yes   How would the pt like to obtain the AVS?:  Patient declined  AVS SmartPhrase [PsychAVS] has been placed in 'Patient Instructions':  N/A     Start Time: 3:01            End Time:  3:16    PSYCHIATRY CLINIC PROGRESS NOTE    30 minute medication management   IDENTIFICATION: Carloz Drummond is a 41 year old male with previous psychiatric diagnoses of autism and PTSD. Pt was napping during the appointment. Sister present for ongoing psychiatric follow-up and pt was seen for last transfer of care evaluation on 3/18/2020.  SUBJECTIVE / INTERIM HISTORY     The pt was last seen in clinic 8/3/2021 at which time no medication changes were made. The patient reports good medication adherence. Since the last visit, he has taken his medication daily as prescribed. He continues to struggle with urination and making bowel movements. Both take a very long time and per sister, seems that he waits until he cannot hold it anymore. Per sister, they continue to work with  on getting into better housing.     SYMPTOMS include some increased defiance related to grieving his  "mother, per sister. Sister states that he will \"fuss and curse every now and then\". However, she states that this all feels overall manageable. Sister reports that while Carloz is at work, he is being allowed to collect dirty papers and cards that Ambrosio Marina then has to remove from him. He is afterward irritable and disrespectful at home if she has to take an item away.     Current Substance Use- none known. Sober support- na     MEDICAL ROS          Reports A comprehensive review of systems was performed and is negative other than noted in the HPI.    PAST MEDICATION TRIALS    Naltrexone 50 mg daily previously prescribed for self-injury, unsure of effectiveness.    Hydroxyzine 50 mg at bedtime, decreased response over time.    Risperdal M-Tabs 1 mg PRN, noted to be ineffective for compulsive behavior (collecting/tearing up paper).  MEDICAL HISTORY      Primary Care Physician: Clinic, [facility] at [address] [place] [address]     Neurologic Hx:  head injury- none     seizure- none      LOC- none    other- na   Patient Active Problem List   Diagnosis     Autism     ALLERGY     No Known Allergies    MEDICATIONS      Current Outpatient Medications   Medication Sig     ARIPiprazole (ABILIFY) 30 MG tablet Take 1 tablet (30 mg) by mouth daily     lisinopril-hydrochlorothiazide (PRINZIDE/ZESTORETIC) 10-12.5 MG tablet Take 1 tablet by mouth daily     prazosin (MINIPRESS) 2 MG capsule Take 1 capsule (2 mg) by mouth At Bedtime .     traZODone (DESYREL) 50 MG tablet Take 1.5 tablets (75 mg) by mouth At Bedtime     No current facility-administered medications for this visit.       Drug Interaction Check is remarkable for:  None  VITALS    There were no vitals taken for this visit.  LABS  use PSYCHLAB______       No results found for any previous visit.       MENTAL STATUS EXAM     Pt was not engaged in appointment due to being asleep    PSYCHOLOGICAL TESTING:     none    ASSESSMENT     Carloz Drummond " is a 41 year old male with psychiatric diagnoses of autism and PTSD. He was not present for the video visit. He was napping and could be heard in the background snoring. Sister reports that things are overall stable. She is still struggling to navigate the supports for Carloz and is focusing mostly at this time on housing. Will send a message to  for help navigating PCP and getting Carloz back in for labs and check-up. No medication changes at this time. Follow-up in 4 weeks.       TREATMENT RISK STATEMENT:  The risks, benefits, alternatives and potential adverse effects have been explained and are understood by the pt and pt's parent(s)/guardian.  Discussion of specific concerns included- N/A. The  pt and pt's parent(s)/guardian agrees to the treatment plan with the ability to do so. The  pt and pt's parent(s)/guardian knows to call the clinic for any problems or access emergency care if needed. There are no medical considerations relevant to treatment, as noted above. Substance use is not a problem as noted above.      Drug interaction check was done for any med changes and is discussed above.      DIAGNOSES                                                                                                      Encounter Diagnoses   Name Primary?     PTSD (post-traumatic stress disorder) Yes     Autism                                  PLAN                                                                                                 Medication Plan:         -- Continue trazodone 75 mg PO Q HS                refills not needed       -- Continue Abilify 30 mg PO Q Day                90 day supply sent       -- Continue prazosin 2 mg PO Q HS                refills not needed    Labs:  none    Pt monitor [call for probs]: nothing specific needed    THERAPY: No Change    REFERRALS [CD, medical, other]:  none    :  none    Controlled Substance Contract was not completed    RTC:  4 weeks    CRISIS NUMBERS:  Provided in AVS upon request of patient/guardian.

## 2021-09-08 NOTE — TELEPHONE ENCOUNTER
Writer called at 252 but the phone said it was no longer in service, no way to LVM, writer did call the sister in file but no answer.  Coty Hager, CMA

## 2021-10-28 ENCOUNTER — TELEPHONE (OUTPATIENT)
Dept: PSYCHIATRY | Facility: CLINIC | Age: 41
End: 2021-10-28

## 2021-10-28 ENCOUNTER — VIRTUAL VISIT (OUTPATIENT)
Dept: PSYCHIATRY | Facility: CLINIC | Age: 41
End: 2021-10-28
Attending: NURSE PRACTITIONER
Payer: MEDICAID

## 2021-10-28 DIAGNOSIS — F84.0 AUTISM SPECTRUM DISORDER: ICD-10-CM

## 2021-10-28 DIAGNOSIS — F43.10 PTSD (POST-TRAUMATIC STRESS DISORDER): Primary | ICD-10-CM

## 2021-10-28 DIAGNOSIS — F84.0 AUTISM: ICD-10-CM

## 2021-10-28 PROCEDURE — 99442 PR PHYSICIAN TELEPHONE EVALUATION 11-20 MIN: CPT | Performed by: NURSE PRACTITIONER

## 2021-10-28 RX ORDER — TRAZODONE HYDROCHLORIDE 50 MG/1
75 TABLET, FILM COATED ORAL AT BEDTIME
Qty: 135 TABLET | Refills: 0 | Status: SHIPPED | OUTPATIENT
Start: 2021-11-05 | End: 2021-12-29

## 2021-10-28 RX ORDER — PRAZOSIN HYDROCHLORIDE 2 MG/1
2 CAPSULE ORAL AT BEDTIME
Qty: 90 CAPSULE | Refills: 0 | Status: SHIPPED | OUTPATIENT
Start: 2021-11-05 | End: 2021-12-29

## 2021-10-28 NOTE — PROGRESS NOTES
"TELEPHONE VISIT  Carloz Drummond is a 41 year old pt. who is being evaluated via a billable telephone visit.      The patient has been notified of the following:    We have found that certain health care needs can be provided without the need for a physical exam. This service lets us provide the care you need with a short phone conversation. If a prescription is necessary we can send it directly to your pharmacy. If lab work is needed we can place an order for that and you can then stop by our lab to have the test done at a later time. Insurers are generally covering virtual visits as they would in-office visits so billing should not be different than normal.  If for some reason you do get billed incorrectly, you should contact the billing office to correct it and that number is in the AVS .    Patient has given verbal consent for a telephone visit?:  Yes   How would the pt like to obtain the AVS?:  Patient declined  AVS SmartPhrase [PsychAVS] has been placed in 'Patient Instructions':  N/A     Start Time:  3:08          End Time:  3:26    PSYCHIATRY CLINIC PROGRESS NOTE    30 minute medication management   IDENTIFICATION: Carloz Drummond is a 41 year old male with previous psychiatric diagnoses of autism and PTSD. Pt and sister present for ongoing psychiatric follow-up and pt was seen for last transfer of care evaluation on 3/18/2020.  SUBJECTIVE / INTERIM HISTORY     The pt was last seen in clinic 9/8/2021 at which time no medication changes were made. The patient reports good medication adherence. Since the last visit, he has been taking his medication daily as prescribed, per sister. He has started respite services on the weekends now. Per Sister, she is officially Carloz's guardian now and has this paperwork.     SYMPTOMS include worsening sleep. Per Sister, he has been staying up late at night and seems to be \"fighting his meds\". Respite has been noting an increase in masturbation late night but that he has " "been redirectable so far. Though, sister reports that she wonders if some of this behavior is related to getting his medication too late at respite or being left unattended. No other safety concerns reported.    Current Substance Use- none. Sober support- na     MEDICAL ROS          Reports A comprehensive review of systems was performed and is negative other than noted in the HPI.    PAST MEDICATION TRIALS    Naltrexone 50 mg daily previously prescribed for self-injury, unsure of effectiveness.    Hydroxyzine 50 mg at bedtime, decreased response over time.    Risperdal M-Tabs 1 mg PRN, noted to be ineffective for compulsive behavior (collecting/tearing up paper).  MEDICAL HISTORY      Primary Care Physician: Clinic, [facility] at [address] [place] [address]     Neurologic Hx:  head injury- none     seizure- none      LOC- none    other- na   Patient Active Problem List   Diagnosis     Autism     ALLERGY     No Known Allergies    MEDICATIONS      Current Outpatient Medications   Medication Sig     ARIPiprazole (ABILIFY) 30 MG tablet Take 1 tablet (30 mg) by mouth daily     lisinopril-hydrochlorothiazide (PRINZIDE/ZESTORETIC) 10-12.5 MG tablet Take 1 tablet by mouth daily     prazosin (MINIPRESS) 2 MG capsule Take 1 capsule (2 mg) by mouth At Bedtime .     traZODone (DESYREL) 50 MG tablet Take 1.5 tablets (75 mg) by mouth At Bedtime     No current facility-administered medications for this visit.       Drug Interaction Check is remarkable for:  None  VITALS    There were no vitals taken for this visit.  LABS  use PSYCHLAB______       No results found for any previous visit.       MENTAL STATUS EXAM     Alertness: alert  and oriented  Appearance: unable to assess by telephone  Behavior/Demeanor: unable to assess by telephone,  Speech: 1-2 word phrases, short,some echolalia noted  Language: intact and limited vocabulary  Psychomotor: unable to assess by telephone  Mood:  \"okay\" per " sister  Affect: unable to assess by telephone;   Thought Process/Associations: perseverative by parent report  Thought Content: no observable suicidal or homicidal ideation  Perception: no  Observable hallucinations  Insight: poor  Judgment: limited  Cognition: does not appear grossly intact; formal cognitive testing was not done       PSYCHOLOGICAL TESTING:     none    ASSESSMENT     Carloz Drummond is a 41 year old male with psychiatric diagnoses of autism and PTSD. He was minimally engaged in the phone call but could be heard in the background repeating some of the conversation to himself. Carloz seems to be doing well but sleep initiation has become more difficult lately. Discussed that varying sleep routines between respite and sister's house may be contributing to this. No medication changes recommended today. Message sent to  for help obtaining guardianship paperwork and  [facility] for coordination with other services for Carloz. Follow-up in 2 months.     TREATMENT RISK STATEMENT:  The risks, benefits, alternatives and potential adverse effects have been explained and are understood by the pt and pt's parent(s)/guardian.  Discussion of specific concerns included- N/A. The  pt and pt's parent(s)/guardian agrees to the treatment plan with the ability to do so. The  pt and pt's parent(s)/guardian knows to call the clinic for any problems or access emergency care if needed. There are no medical considerations relevant to treatment, as noted above. Substance use is not a problem as noted above.      Drug interaction check was done for any med changes and is discussed above.      DIAGNOSES                                                                                                      Encounter Diagnoses   Name Primary?     PTSD (post-traumatic stress disorder) Yes     Autism spectrum disorder                                  PLAN                                                                                                  Medication Plan:         -- continue abilify 30 mg PO Q Day   Refills should not be needed       -- Continue prazosin 2 mg PO Q HS                sent       -- Continue trazodone 75 mg PO Q HS               sent    Labs:  none    Pt monitor [call for probs]: nothing specific needed    THERAPY: No Change    REFERRALS [CD, medical, other]:  none    :  none    Controlled Substance Contract was not completed    RTC: 2 months    CRISIS NUMBERS: Provided in AVS upon request of patient/guardian.

## 2021-10-28 NOTE — TELEPHONE ENCOUNTER
Placed call to pharmacy to obtain additional information. Staff stated 90 d/s cannot be filled because patient is on MA. Script entered as three separate 30 d/s. Pharmacy is filling one now. Patient's sister notified via telephone.

## 2021-10-28 NOTE — TELEPHONE ENCOUNTER
----- Message from Agnieszka Saab RN sent at 10/28/2021  3:48 PM CDT -----  Regarding: FW: call pharmacy to confirm orders/prescriptions    ----- Message -----  From: Delicia Diop NP  Sent: 10/28/2021   3:28 PM CDT  To: Amaya Hernandez RN  Subject: call pharmacy to confirm orders/prescriptions    Hi Amaya,    I just got off the phone with Ambrosio Salas, Carloz's sister and she tells me she is almost out of abilify, which implies she did not get a 90 day supply like I'd ordered last time. Can you please call the pharmacy and figure out what happened and confirm that they can refill the remaining prescription today. Then, please call Ambrosio Marina back to let her know. She told me today that she has to walk to the pharmacy so I think long-term we should set this family up with mail order.    Thanks in advance!  Paulette

## 2021-11-08 ENCOUNTER — TELEPHONE (OUTPATIENT)
Dept: PSYCHIATRY | Facility: CLINIC | Age: 41
End: 2021-11-08
Payer: MEDICAID

## 2021-11-08 ENCOUNTER — DOCUMENTATION ONLY (OUTPATIENT)
Dept: OTHER | Facility: CLINIC | Age: 41
End: 2021-11-08
Payer: MEDICAID

## 2021-11-08 NOTE — CONFIDENTIAL NOTE
SW left message for Ambrosio Marina (patient's sister). Writer checking in on needs. Writer hoping to follow up on status of guardianship paperwork. Writer also hoping to help family make appointment for primary care. Writer offered to call and set up appt. Provided contact number and number for past PCP (per chart review): [facility] [address]son at [phone]. Provided contact number.    Emailed [employer] regarding status of guardianship.    Will follow up in 1 week if no response.    Aria Mckoy, YESI, [facility]  [phone]        Request from provider, Paulette Diop:     Can you help me out and call sister to make sure we get all the right documentation regarding guardianship, which sister says she now officially has as of this month. And then once we have that some releases so that our clinic can talk to respite and day programming for sister as needed. She is struggling a lot right now with them allowing Carty to earn multiple decks of cards and pieces of paper and then she has to take these items away when he gets home because he has too many. Also, if you could see where they are at with getting back into primary care, that would be super helpful!

## 2021-11-17 ENCOUNTER — TELEPHONE (OUTPATIENT)
Dept: PSYCHIATRY | Facility: CLINIC | Age: 41
End: 2021-11-17
Payer: MEDICAID

## 2021-11-17 NOTE — TELEPHONE ENCOUNTER
SW called Carloz's sister, Pura Marina, to follow up on multiple needs.    -provided update that [employer] were able to review guardianship paperwork and update in our health records. Pura Marina should not have additional follow up questions about it at this time.  -discussed completing ROIs for Carloz's care providers. Pura Marina requested writer send by mail to their home.  -following up on message about setting up appointment with PCP.  Pura Marina would like to do this, but she does not know the provider name or number. She also noted concern that it is taking [place] extra time to use the bathroom and she is concerned.  Writer able to obtain info from records and share with her. Sister did not have time to call with writer today, made appointment for phone meeting on 11/19 at 1:00 pm.  -Pura Marina would also like to discuss concerns about medications for Carloz. She notes that he is resistant to taking medications at night then is up for several hours. Writer reviewed that she is a . She can listen to concerns and pass on to provider, but is unable to make any medication recommendations.    Aria Mckoy, YESI, [facility]  [phone]

## 2021-11-19 ENCOUNTER — TELEPHONE (OUTPATIENT)
Dept: PSYCHIATRY | Facility: CLINIC | Age: 41
End: 2021-11-19
Payer: MEDICAID

## 2021-11-19 NOTE — TELEPHONE ENCOUNTER
SW called Pura Marina, patient's sister and guardian, to help make PCP visit.     Pura aMrina also wanted to share concerns about Carloz's sleep patterns that are also impacting her. She notes she has talked with Paulette Awad about concerns before and she prescribed an additional partial dose of meds. Per sister, Elieser gets home from work around 1 pm and will appear wound up. Around 3, 4, or 5 he appeared more amped up. They have dinner between 5 and 7 pm and Pura Marina will give him his night meds around this time. He will then be up and talkative for some time. She tries to get him to bed around 7 but he will fight sleep or going to bed from 7 pm to around 4 am. He will often pretend to sleep or snore, but if family members fall asleep he will be up and tearing up paper or tissues. He gets up around 5:30 am in order to get ready for work and will report feeling tired and trying to sleep right around the time his work bus shows up. She notes this happens almost every night and it appears this may have occurred when he was cared for by his mother. Pura also shared that when he was younger, he would tear up cans with his teeth if he had access to them. Writer wondered if this was a sensory need.     Writer will forward to Paulette regarding possible medication suggestions. Pura Marina notes that she wants to be careful/thoughtful about the amount of medications given to Carloz. Writer suggested checking with his work program to see if he happens to sleep at all during his time there. Writer also suggested asking his PCP for a referral for an OT assessment as OT services may be able to offer services/support related to some of his perseverative activities if they are sensory related.     Writer assisted Pura Marina in making a referral for PCP. Appt made for 12/16/2021 at 9:00 am.    Pura Marina also asked for records in order to help Carloz obtain a social security card as housing worker noted this may be  holding up progress in getting housing. Writer will send out KELLI for Pura Marina to complete.    Aria Mckoy, YESI, LICSW  [phone]

## 2021-11-22 NOTE — TELEPHONE ENCOUNTER
Irvin Curtis,    This is all super helpful. Thank you so much. With an update blood pressure from PCP I should be able to do some tweaking on his night time meds the next time I see him and optimize those.    Paulette

## 2021-11-27 DIAGNOSIS — F84.0 AUTISM: ICD-10-CM

## 2021-11-27 DIAGNOSIS — F43.10 PTSD (POST-TRAUMATIC STRESS DISORDER): ICD-10-CM

## 2021-11-29 RX ORDER — PRAZOSIN HYDROCHLORIDE 2 MG/1
CAPSULE ORAL
Qty: 90 CAPSULE | Refills: 0 | OUTPATIENT
Start: 2021-11-29

## 2021-11-29 RX ORDER — TRAZODONE HYDROCHLORIDE 50 MG/1
TABLET, FILM COATED ORAL
Qty: 135 TABLET | Refills: 0 | OUTPATIENT
Start: 2021-11-29

## 2021-11-29 NOTE — TELEPHONE ENCOUNTER
Patient was last prescribed a 90-day supply of prazosin 2mg and trazadone 75mg on 11/05/2021.  No refills needed at this time.    Cheryl Wilson RN

## 2021-12-13 ENCOUNTER — TELEPHONE (OUTPATIENT)
Dept: PSYCHIATRY | Facility: CLINIC | Age: 41
End: 2021-12-13
Payer: MEDICAID

## 2021-12-13 NOTE — TELEPHONE ENCOUNTER
SW left  for Pura Marina, patient's sister and guardian. Writer calling to check in on well-being and to remind about PCP appt on 12/16. Writer also reviewed that Paulette is hoping to get an update on current blood pressure before identifying possible ways to manage/update current night meds that may help manage Carty's sleep.    Provided contact number and encouraged Pura Marina to call if she needed support.    YESI Villalobos, [facility]  [phone]

## 2021-12-29 ENCOUNTER — VIRTUAL VISIT (OUTPATIENT)
Dept: PSYCHIATRY | Facility: CLINIC | Age: 41
End: 2021-12-29
Payer: MEDICAID

## 2021-12-29 DIAGNOSIS — F84.0 AUTISM SPECTRUM DISORDER: ICD-10-CM

## 2021-12-29 DIAGNOSIS — F84.0 AUTISM: ICD-10-CM

## 2021-12-29 DIAGNOSIS — F43.10 PTSD (POST-TRAUMATIC STRESS DISORDER): ICD-10-CM

## 2021-12-29 PROCEDURE — 99213 OFFICE O/P EST LOW 20 MIN: CPT | Mod: 95 | Performed by: NURSE PRACTITIONER

## 2021-12-29 RX ORDER — TRAZODONE HYDROCHLORIDE 100 MG/1
100 TABLET ORAL AT BEDTIME
Qty: 30 TABLET | Refills: 0 | Status: SHIPPED | OUTPATIENT
Start: 2021-12-29 | End: 2022-01-18

## 2021-12-29 RX ORDER — ARIPIPRAZOLE 30 MG/1
30 TABLET ORAL DAILY
Qty: 90 TABLET | Refills: 0 | Status: SHIPPED | OUTPATIENT
Start: 2021-12-29 | End: 2022-01-18

## 2021-12-29 RX ORDER — PRAZOSIN HYDROCHLORIDE 2 MG/1
2 CAPSULE ORAL AT BEDTIME
Qty: 90 CAPSULE | Refills: 0 | Status: SHIPPED | OUTPATIENT
Start: 2022-02-05 | End: 2022-01-18

## 2021-12-29 NOTE — PATIENT INSTRUCTIONS
**For crisis resources, please see the information at the end of this document**   Patient Education    Thank you for coming to the [facility].    Lab Testing:  If you had lab testing today and your results are reassuring or normal they will be mailed to you or sent through [employer] within 7 days. If the lab tests need quick action we will call you with the results. The phone number we will call with results is # [phone] (home) . If this is not the best number please call our clinic and change the number.    Medication Refills:  If you need any refills please call your pharmacy and they will contact us. Our fax number for refills is [phone]. Please allow three business for refill processing. If you need to  your refill at a new pharmacy, please contact the new pharmacy directly. The new pharmacy will help you get your medications transferred.     Scheduling:  If you have any concerns about today's visit or wish to schedule another appointment please call our office during normal business hours [phone] (8-5:00 M-F)    Contact Us:  Please call [phone] during business hours (8-5:00 M-F).  If after clinic hours, or on the weekend, please call  [phone].    Financial Assistance [phone]  [facility] Billing [phone]  Central Billing Office, [facility]: [phone]  [facility] Billing [phone]  Medical Records [phone]  [facility] Patient Bill of Rights [email or web address]       MENTAL HEALTH CRISIS NUMBERS:  For a medical emergency please call  911 or go to the nearest ER.     [facility]:   [facility] -[phone]   Crisis Residence [facility] Residence -[phone]   Walk-In Counseling Center [facility] -[phone]   COPE 24/7 [place] Mobile Team -[phone] (adults)/[phone] (child)  CHILD: [facility] needs assessment team - [phone]       [facility]:   [facility] - [phone]   Walk-in counseling [facility] - [phone]   Walk-in counseling [facility] - [phone]   Crisis Residence [facility] Residence - [phone]  Urgent Care Adult Mental [facility]-[phone] mobile unit/ 24/7 crisis line    National Crisis Numbers:   National Suicide Prevention Lifeline: 0-458-024-TALK (729-601-6258)  Poison Control Center - 9-489-824-1456  [facility]/resources for a list of additional resources (SOS)  Trans Lifeline a hotline for transgender people 0-186-666-3132  The [employer] a hotline for LGBT youth 1-532.813.8206  Crisis Text Line: For any crisis 24/7   To: 383691  see www.crisistextline.org  - IF MAKING A CALL FEELS TOO HARD, send a text!         Again thank you for choosing [facility] and please let us know how we can best partner with you to improve you and your family's health.    You may be receiving a survey regarding this appointment. We would love to have your feedback, both positive and negative. The survey is done by an external company, so your answers are anonymous.

## 2021-12-29 NOTE — PROGRESS NOTES
"Carloz Drummond is a 41 year old male who is being evaluated via a billable telephone visit.      What phone number would you like to be contacted at? [phone]  How would you like to obtain your AVS? by Mail    Phone call duration: 24 minutes  PSYCHIATRY CLINIC PROGRESS NOTE    30 minute medication management   IDENTIFICATION: Carloz Drummond is a 41 year old male with previous psychiatric diagnoses of autism and PTSD. Pt and sister (pt's guardian) present for ongoing psychiatric follow-up and pt was seen for last transfer of care evaluation on 3/18/2020.  SUBJECTIVE / INTERIM HISTORY     The pt was last seen in clinic 10/28/2021 at which time no medication changes were made. The patient reports good medication adherence. Since the last visit, he has taken his medication daily as prescribed. He seems to be fighting his medication and is not sleeping anymore. He has not made it to the PCP appointment. He is overdue for a dental appointment as well. Pura Marina is concerned that he may have some teeth pulled. His aunt and uncle have passed away recently. Pura Marina is not planning to bring Carloz to the .     SYMPTOMS include increased defiance especially at night time. He is not aggressive but is making more vocalizations and saying \"no\" to lying down and falling asleep. He is up most of the night. Sister reports that he seems to have less structure in his day program and thinks this may be partially impacting Carloz pushing limits at home as well. No safety concerns reported today.    Current Substance Use- none. Sober support- na     MEDICAL ROS          Reports A comprehensive review of systems was performed and is negative other than noted in the HPI.    PAST MEDICATION TRIALS    Naltrexone 50 mg daily previously prescribed for self-injury, unsure of effectiveness.    Hydroxyzine 50 mg at bedtime, decreased response over time.    Risperdal M-Tabs 1 mg PRN, noted to be ineffective for compulsive " "behavior (collecting/tearing up paper).  MEDICAL HISTORY      Primary Care Physician: [facility] at [address] [place] [address]     Neurologic Hx:  head injury- none     seizure- none      LOC- none    other- na   Patient Active Problem List   Diagnosis     Autism     ALLERGY     No Known Allergies    MEDICATIONS      Current Outpatient Medications   Medication Sig     ARIPiprazole (ABILIFY) 30 MG tablet Take 1 tablet (30 mg) by mouth daily     lisinopril-hydrochlorothiazide (PRINZIDE/ZESTORETIC) 10-12.5 MG tablet Take 1 tablet by mouth daily     [START ON 2/5/2022] prazosin (MINIPRESS) 2 MG capsule Take 1 capsule (2 mg) by mouth At Bedtime .     traZODone (DESYREL) 100 MG tablet Take 1 tablet (100 mg) by mouth At Bedtime     No current facility-administered medications for this visit.       Drug Interaction Check is remarkable for:  None  VITALS    There were no vitals taken for this visit.  LABS  use PSYCHLAB______       No results found for any previous visit.       MENTAL STATUS EXAM     Alertness: alert  and oriented  Appearance: unable to assess by telephone  Behavior/Demeanor: unable to assess by telephone,  Speech: 1-2 word phrases, short,some echolalia noted  Language: intact and limited vocabulary  Psychomotor: unable to assess by telephone  Mood:  \"okay\" per sister  Affect: unable to assess by telephone;   Thought Process/Associations: perseverative by parent report  Thought Content: no observable suicidal or homicidal ideation  Perception: no  Observable hallucinations  Insight: poor  Judgment: limited  Cognition: does not appear grossly intact; formal cognitive testing was not done       PSYCHOLOGICAL TESTING:     none    ASSESSMENT     Carloz Drummond is a 41 year old male with psychiatric diagnoses of autism and PTSD. He was minimally engaged in the phone call but could be heard in the background making vocalizations to himself. Sister reports that Carloz has been " more defiant at bedtime. He seems to be both refusing sleep and less tired. Plan made to increase trazodone to 100 mg nightly. Will reach out to  to assist with setting up PCP and dental appointments. Discussed with sister that Carloz could have a PRN if needed for a scheduled dental appointment. Follow-up planned for 1 month. Sister informed to call clinic prior to next appointment with any questions, concerns, or if the dose increase does not seem helpful.      TREATMENT RISK STATEMENT:  The risks, benefits, alternatives and potential adverse effects have been explained and are understood by the pt and pt's parent(s)/guardian.  Discussion of specific concerns included- N/A. The  pt and pt's parent(s)/guardian agrees to the treatment plan with the ability to do so. The  pt and pt's parent(s)/guardian knows to call the clinic for any problems or access emergency care if needed. There are no medical considerations relevant to treatment, as noted above. Substance use is not a problem as noted above.      Drug interaction check was done for any med changes and is discussed above.      DIAGNOSES                                                                                                      Encounter Diagnoses   Name Primary?     Autism      Autism spectrum disorder      PTSD (post-traumatic stress disorder)                                    PLAN                                                                                                 Medication Plan:         -- increase trazodone to 100 mg PO Q HS   sent       -- continue abilify 30 mg PO Q Day                 sent       -- Continue prazosin 2 mg PO Q HS                sent    Labs:  none    Pt monitor [call for probs]: nothing specific needed    THERAPY: No Change    REFERRALS [CD, medical, other]:  none    :  none    Controlled Substance Contract was not completed    RTC: 1 month    CRISIS NUMBERS: Provided in AVS upon request of  patient/guardian.

## 2022-01-05 ENCOUNTER — TELEPHONE (OUTPATIENT)
Dept: PSYCHIATRY | Facility: CLINIC | Age: 42
End: 2022-01-05
Payer: MEDICAID

## 2022-01-05 NOTE — TELEPHONE ENCOUNTER
RAYSA left message for Pura Marina:    RAYSA following up on request from Paulette Diop. Writer provided information on PCP:    Dr. Shields [address]  [phone]    Encouraged sister to call if she needed support in connecting to services.    Kirsten Mckoy, MSW, [facility]  [phone]    ----- Message from Delicia Diop NP sent at 12/29/2021  3:11 PM CST -----  Regarding: Call to sister Irvin,    This is not urgent, but cleve missed his PCP appointment and sister cannot remember the name of the clinic or number. Can you call her again at some point and help with this? She states she forgot about it and did not get a reminder call. Also, any good leads on dentists that work with MA and folks with autism? I usually refer [facility] but I don't think Cleve's sister could get him that far. I hope you're having a wonderful holiday!    Thanks in advance,  Paulette

## 2022-01-17 ENCOUNTER — TELEPHONE (OUTPATIENT)
Dept: PSYCHIATRY | Facility: CLINIC | Age: 42
End: 2022-01-17
Payer: MEDICAID

## 2022-01-17 NOTE — TELEPHONE ENCOUNTER
SW placed call to Pura Marina, Carloz's sister and guardian. Writer hoping to check in on whether Pura Marina received message with PCP info. Sister stated she did not as her phone does not always work correctly.    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 He recently developed a fever and shakes. Writer also heard Pura Marina coughing. She is trying to find walk in covid testing site. Writer reviewed state website and provided information for options in [place] ([facility]), [place] ([facility]), and [place]. Family not vaccinated. Pura Marina reports significant worry about impact of vaccines. She also expressed worry about Carloz not being able to communicate needs and suffering adverse effects of illness. Writer acknowledged the difficulty and complexity of the decisions she needs to make for herself and her family. If Carloz is positive, encouraged her to call PCP to get further instruction on how to monitor his health.     Writer will call to check in with family on 1/19/22. Provided numbers for PCP and writer.    YESI Villalobos, [facility]  [phone]

## 2022-01-18 DIAGNOSIS — F84.0 AUTISM SPECTRUM DISORDER: ICD-10-CM

## 2022-01-18 DIAGNOSIS — F43.10 PTSD (POST-TRAUMATIC STRESS DISORDER): ICD-10-CM

## 2022-01-18 DIAGNOSIS — F84.0 AUTISM: ICD-10-CM

## 2022-01-18 NOTE — TELEPHONE ENCOUNTER
Called pharmacy who confirmed that patient is only able to get 30 d/s due to Medicaid insurance.    Called Pura Marina and offered to transfer prescription to mail order pharmacy.and she was agreeable to trying [facility].    Irvin Caldwell,     With patient's most recent refills waiting at the pharmacy(no refills remain to be transferred), I think reordering Carty's prescriptions to [facility] would be best.  Do you agree?  I pended 1 month supply with 2 refills to hold them over for a little bit.  Are you willling to sign?    Amaya

## 2022-01-18 NOTE — TELEPHONE ENCOUNTER
Thanks, Kirsten. I have attempted this before and was told that insurance will only fill 30 days at a time. I am cc'ing Amaya to see if there may be some mail order option we could switch to.    Thanks!  Paulette

## 2022-01-18 NOTE — TELEPHONE ENCOUNTER
Social Work   Incoming Voicemail  [facility]    Incoming Voicemail From:  Pura Drummond, patient's guardian and sister    Content of Voicemail:    Pura Marina reports that pharmacy found the prescription for 100 mg medicine and will fill. They are also filling the 30 mg aripiprazole. Pura Marina is wondering if clinic can bump up to 90 day supply instead of 30 day supply. This way she won't have to be walking in the cold as she does not have transportation. She is requesting call back.    Response/Plan:    SW will consult with med prescriber.      Will route to patient's current psychiatric provider(s) as an FYI.   Please call or EPIC message with any questions or concerns.    Aria Mckoy, YESI, LICSW

## 2022-01-19 RX ORDER — TRAZODONE HYDROCHLORIDE 100 MG/1
100 TABLET ORAL AT BEDTIME
Qty: 30 TABLET | Refills: 2 | Status: SHIPPED | OUTPATIENT
Start: 2022-01-19 | End: 2022-05-03

## 2022-01-19 RX ORDER — ARIPIPRAZOLE 30 MG/1
30 TABLET ORAL DAILY
Qty: 30 TABLET | Refills: 2 | Status: SHIPPED | OUTPATIENT
Start: 2022-01-19 | End: 2022-05-03

## 2022-01-19 RX ORDER — PRAZOSIN HYDROCHLORIDE 2 MG/1
2 CAPSULE ORAL AT BEDTIME
Qty: 30 CAPSULE | Refills: 2 | Status: SHIPPED | OUTPATIENT
Start: 2022-02-05 | End: 2022-05-11 | Stop reason: DRUGHIGH

## 2022-01-19 NOTE — TELEPHONE ENCOUNTER
SW left follow up voicemail checking in on well-being and seeing if Pura Marina had been able to get covid testing for family. Provided contact number.    YESI Villalobos, [facility]  [phone]

## 2022-01-19 NOTE — TELEPHONE ENCOUNTER
- medication refilled by provider  - med tab updated to reflect this  - patient notified via telephone

## 2022-01-24 ENCOUNTER — TELEPHONE (OUTPATIENT)
Dept: PSYCHIATRY | Facility: CLINIC | Age: 42
End: 2022-01-24
Payer: MEDICAID

## 2022-01-24 NOTE — TELEPHONE ENCOUNTER
Writer called to check on well-being of patient (reported potential covid-like symptoms previous week with multiple risk factors).    Pura Marina (sister/guardian) reports Carloz is doing much better and is back at his work program today.     Pura Marina requested help in obtaining Carloz's medications. Pharmacy is unable to deliver medications until she has [employer] and other numbers to provide them. Pura Marina does not have patient's health insurance card and cannot find in her mother's possessions.    Writer assisted Pura Marina in calling [facility] and requesting new card ([phone]). Card will be mailed to Pura Marina in 7-10 days. Writer also helped Pura Marina called [facility] pharmacy to provide updated information. They will review records and follow up with Pura Marina if needed.    YESI Villalobos, [facility]  [phone]

## 2022-02-01 ENCOUNTER — VIRTUAL VISIT (OUTPATIENT)
Dept: PSYCHIATRY | Facility: CLINIC | Age: 42
End: 2022-02-01
Payer: MEDICAID

## 2022-02-01 DIAGNOSIS — Z53.9 ERRONEOUS ENCOUNTER--DISREGARD: Primary | ICD-10-CM

## 2022-02-01 PROCEDURE — 99207 PR NO BILLABLE SERVICE THIS VISIT: CPT | Performed by: NURSE PRACTITIONER

## 2022-02-01 NOTE — PROGRESS NOTES
Carloz Drummond is a 42 year old male who is being evaluated via a billable telephone visit.      What phone number would you like to be contacted at? [phone]  Guardian was unsure about medication names, it also sounds like they have recently received a new medication, please verify medications on file along with dose  How would you like to obtain your AVS? by Mail    Phone call duration: 0 minutes    Call placed to pt's guardian but was not able to reach them. Voicemail left with instruction to return call/reschedule appointment. Clinic number provided.

## 2022-02-01 NOTE — PATIENT INSTRUCTIONS
**For crisis resources, please see the information at the end of this document**   Patient Education    Thank you for coming to the [facility].    Lab Testing:  If you had lab testing today and your results are reassuring or normal they will be mailed to you or sent through [facility] within 7 days. If the lab tests need quick action we will call you with the results. The phone number we will call with results is # [phone] (home) . If this is not the best number please call our clinic and change the number.    Medication Refills:  If you need any refills please call your pharmacy and they will contact us. Our fax number for refills is [phone]. Please allow three business for refill processing. If you need to  your refill at a new pharmacy, please contact the new pharmacy directly. The new pharmacy will help you get your medications transferred.     Scheduling:  If you have any concerns about today's visit or wish to schedule another appointment please call our office during normal business hours [phone] (8-5:00 M-F)    Contact Us:  Please call [phone] during business hours (8-5:00 M-F).  If after clinic hours, or on the weekend, please call  [phone].    Financial Assistance [phone]  [facility] Billing [phone]  Central Billing Office, [facility]: [phone]  [facility] Billing [phone]  Medical Records [phone]  [facility] Patient Bill of Rights [email or web address]       MENTAL HEALTH CRISIS NUMBERS:  For a medical emergency please call  911 or go to the nearest ER.     [facility]:   [facility] -[phone]   Crisis Residence [facility] -[phone]   Walk-In Counseling Center [facility] -[phone]   COPE 24/7 [place] Mobile Team -[phone] (adults)/[phone] (child)  CHILD: [facility] needs assessment team - [phone]       [facility]:   [facility] - [phone]   Walk-in counseling [facility] - [phone]   Walk-in counseling [facility] - [phone]   Crisis Residence [facility] - [phone]  Urgent Care Adult Mental [facility]-[phone] mobile unit/ 24/7 crisis line    National Crisis Numbers:   National Suicide Prevention Lifeline: 6-605-673-TALK (213-613-9708)  Poison Control Center - 8-022-103-7072  RawData/resources for a list of additional resources (SOS)  Trans Lifeline a hotline for transgender people 7-519-181-0040  The [employer] a hotline for LGBT youth 1-776.756.7554  Crisis Text Line: For any crisis 24/7   To: 978941  see www.crisistextline.org  - IF MAKING A CALL FEELS TOO HARD, send a text!         Again thank you for choosing [facility] and please let us know how we can best partner with you to improve you and your family's health.    You may be receiving a survey regarding this appointment. We would love to have your feedback, both positive and negative. The survey is done by an external company, so your answers are anonymous.

## 2022-05-03 DIAGNOSIS — F84.0 AUTISM: ICD-10-CM

## 2022-05-03 DIAGNOSIS — F84.0 AUTISM SPECTRUM DISORDER: ICD-10-CM

## 2022-05-03 RX ORDER — TRAZODONE HYDROCHLORIDE 100 MG/1
100 TABLET ORAL AT BEDTIME
Qty: 30 TABLET | Refills: 0 | Status: SHIPPED | OUTPATIENT
Start: 2022-05-03 | End: 2022-05-11

## 2022-05-03 RX ORDER — ARIPIPRAZOLE 30 MG/1
30 TABLET ORAL DAILY
Qty: 30 TABLET | Refills: 0 | Status: SHIPPED | OUTPATIENT
Start: 2022-05-03 | End: 2022-05-11

## 2022-05-03 NOTE — TELEPHONE ENCOUNTER
Irvin Caldwell,     Patient appears to be filling consistently.  Are you ok with providing another month of refills while we work on scheduling?    Thanks, Amaya

## 2022-05-03 NOTE — TELEPHONE ENCOUNTER
"  Refill request received from: pharmacy    Requested medication(s): ARIPiprazole (Abilify) 30 MG tablet and traZODone (Desyrel) 100 mg tablet     Last appointment: 12/29/2021    Any no showed/ canceled visits since last appointment? Erroneous encounter 2/1/22    Recommended follow up timeframe from last visit: 1 month    Follow up appointment scheduled for: none scheduled at this time    Months of medication pended per [facility] refill protocol: 1    Request was sent to [facility] for approval    If patient is due for follow up \"Appointment required for further refills [phone]\" was placed in the sig of the medication and encounter was routed to scheduling pool to encourage follow up.     Medication pended by: Idania Moore CMA      "

## 2022-05-11 ENCOUNTER — VIRTUAL VISIT (OUTPATIENT)
Dept: PSYCHIATRY | Facility: CLINIC | Age: 42
End: 2022-05-11
Payer: MEDICAID

## 2022-05-11 DIAGNOSIS — F43.10 PTSD (POST-TRAUMATIC STRESS DISORDER): ICD-10-CM

## 2022-05-11 DIAGNOSIS — F84.0 AUTISM SPECTRUM DISORDER: ICD-10-CM

## 2022-05-11 DIAGNOSIS — F84.0 AUTISM: ICD-10-CM

## 2022-05-11 PROCEDURE — 99443 PR PHYSICIAN TELEPHONE EVALUATION 21-30 MIN: CPT | Performed by: NURSE PRACTITIONER

## 2022-05-11 RX ORDER — PRAZOSIN HYDROCHLORIDE 1 MG/1
3 CAPSULE ORAL AT BEDTIME
Qty: 90 CAPSULE | Refills: 2 | Status: SHIPPED | OUTPATIENT
Start: 2022-05-11 | End: 2022-06-23

## 2022-05-11 RX ORDER — ARIPIPRAZOLE 30 MG/1
30 TABLET ORAL DAILY
Qty: 30 TABLET | Refills: 2 | Status: SHIPPED | OUTPATIENT
Start: 2022-05-11 | End: 2022-06-23

## 2022-05-11 RX ORDER — TRAZODONE HYDROCHLORIDE 100 MG/1
100 TABLET ORAL AT BEDTIME
Qty: 30 TABLET | Refills: 2 | Status: SHIPPED | OUTPATIENT
Start: 2022-05-11 | End: 2022-06-23

## 2022-05-11 NOTE — PROGRESS NOTES
"Carloz Drummond is a 42 year old male who is being evaluated via a billable telephone visit.      What phone number would you like to be contacted at? [phone]   How would you like to obtain your AVS? by Mail  Idania Moore CMA    Phone call duration: 24 minutes  PSYCHIATRY CLINIC PROGRESS NOTE    30 minute medication management   IDENTIFICATION: Carloz Drummond is a 42 year old male with previous psychiatric diagnoses of autism and PTSD. Pt's sister (pt's guardian) present for ongoing psychiatric follow-up and pt was seen for last transfer of care evaluation on 3/18/2020.  SUBJECTIVE / INTERIM HISTORY     The pt was last seen in clinic 12/29/2021 at which time trazodone was increased. The patient reports good medication adherence. Since the last visit, he takes his medication daily as prescribed. Per Pura Marina, Carloz has been \"fighting\" his medicine. They have moved to a new household. She is having a hard time setting up respite services for Carloz. She has talked to Joel () about this and he states that the respite provider told him that Pura Marina has not reached out. She is wondering if there is a different provider since the previous respite provider is no longer responding. Per Pura Marina, his teeth likely need to be pulled but she is worried about how he will do and states he will likely need to be put under anesthesia. He is also overdue for a PCP appointment but this has been delayed due to their move.     SYMPTOMS include an increase in difficulty in initiating sleep. He will lay down and make snoring noises but will fight the medication and force himself to stay awake. If Pura moves, he will sit up and stare at her. Per Pura Marina, he looks very tired. She thinks some of this may be related to the neighbor kids being loud at all hours. He has been more defiant lately but not aggressive. No safety concerns reported today.    Current Substance Use- none. Sober support- na "     MEDICAL ROS          Reports A comprehensive review of systems was performed and is negative other than noted in the HPI. Per Pura Marina    PAST MEDICATION TRIALS    Naltrexone 50 mg daily previously prescribed for self-injury, unsure of effectiveness.    Hydroxyzine 50 mg at bedtime, decreased response over time.    Risperdal M-Tabs 1 mg PRN, noted to be ineffective for compulsive behavior (collecting/tearing up paper).  MEDICAL HISTORY      Primary Care Physician: Clinic, [facility] at [address] [place] [address]     Neurologic Hx:  head injury- none     seizure- none      LOC- none    other- na   Patient Active Problem List   Diagnosis     Autism     ALLERGY     No Known Allergies    MEDICATIONS      Current Outpatient Medications   Medication Sig     ARIPiprazole (ABILIFY) 30 MG tablet Take 1 tablet (30 mg) by mouth daily     prazosin (MINIPRESS) 1 MG capsule Take 3 capsules (3 mg) by mouth At Bedtime     traZODone (DESYREL) 100 MG tablet Take 1 tablet (100 mg) by mouth At Bedtime     lisinopril-hydrochlorothiazide (PRINZIDE/ZESTORETIC) 10-12.5 MG tablet Take 1 tablet by mouth daily     No current facility-administered medications for this visit.       Drug Interaction Check is remarkable for:  None  VITALS    There were no vitals taken for this visit.  LABS  use PSYCHLAB______       No results found for any previous visit.       MENTAL STATUS EXAM     Pt not present    PSYCHOLOGICAL TESTING:     none    ASSESSMENT     Carloz Drummond is a 42 year old male with psychiatric diagnoses autism and PTSD. He was not present for today's appointment. Pura Marina (guardian and sister) provided feedback. He continues to fight sleep. He has been more defiant during the day time but is overall settling into their new apartment. Because they had to move so abruptly, he missed most of the medical appointments that Pura Marina and RAYSA had set up. Will reach back out to  for assistance with  setting up medical and dental appointments and to coordinate with  on respite services. Plan made to increase prazosin to 3 mg nightly. Will need BP check prior to any other changes. Follow-up planned for 1 month. Sister informed to call clinic prior to next appointment with any questions, concerns, or if the dose increase does not seem helpful.      TREATMENT RISK STATEMENT:  The risks, benefits, alternatives and potential adverse effects have been explained and are understood by the pt and pt's parent(s)/guardian.  Discussion of specific concerns included- N/A. The  pt and pt's parent(s)/guardian agrees to the treatment plan with the ability to do so. The  pt and pt's parent(s)/guardian knows to call the clinic for any problems or access emergency care if needed. There are no medical considerations relevant to treatment, as noted above. Substance use is not a problem as noted above.      Drug interaction check was done for any med changes and is discussed above.      DIAGNOSES                                                                                                      Encounter Diagnoses   Name Primary?     PTSD (post-traumatic stress disorder)      Autism spectrum disorder      Autism                                  PLAN                                                                                                 Medication Plan:         -- increase prazosin to 3 mg PO Q HS   sent to [facility] mail order       -- continue trazodone 100 mg PO Q HS                 sent to [facility] mail order       -- continue abilify 30 mg PO Q Day                sent to [facility] mail order    Labs:  none    Pt monitor [call for probs]: nothing specific needed    THERAPY: No Change    REFERRALS [CD, medical, other]:  none    :  none    Controlled Substance Contract was not completed    RTC: 1 month    CRISIS NUMBERS: Provided in AVS upon request of patient/guardian.

## 2022-05-11 NOTE — PATIENT INSTRUCTIONS
**For crisis resources, please see the information at the end of this document**   Patient Education    Thank you for coming to the [facility].    Lab Testing:  If you had lab testing today and your results are reassuring or normal they will be mailed to you or sent through [employer] within 7 days. If the lab tests need quick action we will call you with the results. The phone number we will call with results is # [phone] (home) . If this is not the best number please call our clinic and change the number.    Medication Refills:  If you need any refills please call your pharmacy and they will contact us. Our fax number for refills is [phone]. Please allow three business for refill processing. If you need to  your refill at a new pharmacy, please contact the new pharmacy directly. The new pharmacy will help you get your medications transferred.     Scheduling:  If you have any concerns about today's visit or wish to schedule another appointment please call our office during normal business hours [phone] (8-5:00 M-F)    Contact Us:  Please call [phone] during business hours (8-5:00 M-F).  If after clinic hours, or on the weekend, please call  [phone].    Financial Assistance [phone]  [facility] Billing [phone]  Central Billing Office, [facility]: [phone]  [place] Billing [phone]  Medical Records [phone]  [place] Patient Bill of Rights [email or web address]       MENTAL HEALTH CRISIS NUMBERS:  For a medical emergency please call  911 or go to the nearest ER.     [facility]:   [facility] -[phone]   Crisis Residence [facility] -[phone]   Walk-In Counseling Center [facility] -[phone]   COPE 24/7 [place] Mobile Team -[phone] (adults)/[phone] (child)  CHILD: [facility] needs assessment team - [phone]       [facility]:   [facility] - [phone]   Walk-in counseling [facility] - [phone]   Walk-in counseling [facility] - [phone]   Crisis Residence [facility] - [phone]  Urgent Care Adult Mental Mgznzs-[phone] mobile unit/ 24/7 crisis line    National Crisis Numbers:   National Suicide Prevention Lifeline: 6-256-426-TALK (277-093-0223)  Poison Control Center - 0-932-600-0831  [employer]/resources for a list of additional resources (SOS)  Trans Lifeline a hotline for transgender people 0-694-648-4460  [employer] a hotline for LGBT youth 1-327.502.5813  Crisis Text Line: For any crisis 24/7   To: 156749  see www.crisistextline.org  - IF MAKING A CALL FEELS TOO HARD, send a text!         Again thank you for choosing [facility] and please let us know how we can best partner with you to improve you and your family's health.    You may be receiving a survey regarding this appointment. We would love to have your feedback, both positive and negative. The survey is done by an external company, so your answers are anonymous.

## 2022-05-16 ENCOUNTER — TELEPHONE (OUTPATIENT)
Dept: PSYCHIATRY | Facility: CLINIC | Age: 42
End: 2022-05-16
Payer: MEDICAID

## 2022-05-16 NOTE — TELEPHONE ENCOUNTER
Writer left message for Pura Marina (patient's sister/guardian). Writer offered help in connecting to primary care and dental services. Will follow up in 2-3 business days if no response.    PCP: Dr. Prince [phone]  [facility] [phone]      Aria Mckoy, MSW, [facility]  [phone]        ----- Message from Delicia Diop NP sent at 5/11/2022 10:36 AM CDT -----  Regarding: help again with medical appointments, dental, and respite care  Irvin Curtis,  I am wondering if you are able to help Pura Marina again with getting Carloz into PCP and possibly a dentist? She missed the last appointment you helped set up due to having to move abruptly. He will likely need anesthesia for any actual dental procedures but historically did well with his previous PCP if they are still willing to see him after what I assume are a handful of no-shows. She also mentioned that she is having a hard time hearing back from his current respite provider. She will reach out and they won't respond. She has spoken with Carloz's CM (Joel) about this but he has not been terribly helpful and states they claim she is not reaching out. She wonders about a different respite provider. Can you help her navigate this as well?    Thanks again! (Please let me know if you would prefer I ask someone else to help out this time)    Paulette

## 2022-05-24 ENCOUNTER — TELEPHONE (OUTPATIENT)
Dept: PSYCHIATRY | Facility: CLINIC | Age: 42
End: 2022-05-24
Payer: MEDICAID

## 2022-05-24 NOTE — TELEPHONE ENCOUNTER
RAYSA left voicemail for Pura Marina. Writer attempting to help her set up PCP and dental services for Carloz. Provided contact number and requested call back.      YESI Villalobos, [facility]  [phone]

## 2022-06-06 ENCOUNTER — TELEPHONE (OUTPATIENT)
Dept: PSYCHIATRY | Facility: CLINIC | Age: 42
End: 2022-06-06
Payer: MEDICAID

## 2022-06-06 NOTE — TELEPHONE ENCOUNTER
SW called patient's sister/guardian, Pura Marina. She answered phone. Writer reviewed social work referral to help connect family to PCP and dental services. Pura Marina was unsure if Carloz would be able to manage dental exam due to autism. Writer suggested starting with primary care visit first then working on dental next.     Writer assisted Pura Marina in setting up a primary care appointment. Scheduled for Wednesday July 13 at 3:00 pm. Writer will call sister in July to help set up transportation.    Dr. Cornelius Prince  [address]  [phone]      YESI Villalobos, [facility]  [phone]

## 2022-06-23 ENCOUNTER — VIRTUAL VISIT (OUTPATIENT)
Dept: PSYCHIATRY | Facility: CLINIC | Age: 42
End: 2022-06-23
Payer: MEDICAID

## 2022-06-23 DIAGNOSIS — F84.0 AUTISM SPECTRUM DISORDER: ICD-10-CM

## 2022-06-23 DIAGNOSIS — F43.10 PTSD (POST-TRAUMATIC STRESS DISORDER): Primary | ICD-10-CM

## 2022-06-23 DIAGNOSIS — F84.0 AUTISM: ICD-10-CM

## 2022-06-23 PROCEDURE — 99443 PR PHYSICIAN TELEPHONE EVALUATION 21-30 MIN: CPT | Performed by: NURSE PRACTITIONER

## 2022-06-23 RX ORDER — ARIPIPRAZOLE 30 MG/1
30 TABLET ORAL DAILY
Qty: 30 TABLET | Refills: 2 | Status: SHIPPED | OUTPATIENT
Start: 2022-06-23 | End: 2022-10-27

## 2022-06-23 RX ORDER — TRAZODONE HYDROCHLORIDE 100 MG/1
100 TABLET ORAL AT BEDTIME
Qty: 30 TABLET | Refills: 2 | Status: SHIPPED | OUTPATIENT
Start: 2022-06-23 | End: 2022-10-24

## 2022-06-23 RX ORDER — PRAZOSIN HYDROCHLORIDE 1 MG/1
3 CAPSULE ORAL AT BEDTIME
Qty: 90 CAPSULE | Refills: 2 | Status: SHIPPED | OUTPATIENT
Start: 2022-06-23 | End: 2022-10-24

## 2022-06-23 NOTE — PROGRESS NOTES
Carloz Drummond is a 42 year old male who is being evaluated via a billable telephone visit.      What phone number would you like to be contacted at? [phone]  How would you like to obtain your AVS? by Mail    Phone call duration: 26 minutes

## 2022-06-23 NOTE — PATIENT INSTRUCTIONS
**For crisis resources, please see the information at the end of this document**   Patient Education    Thank you for coming to the [facility].    Lab Testing:  If you had lab testing today and your results are reassuring or normal they will be mailed to you or sent through [employer] within 7 days. If the lab tests need quick action we will call you with the results. The phone number we will call with results is # [phone] (home) . If this is not the best number please call our clinic and change the number.    Medication Refills:  If you need any refills please call your pharmacy and they will contact us. Our fax number for refills is [phone]. Please allow three business for refill processing. If you need to  your refill at a new pharmacy, please contact the new pharmacy directly. The new pharmacy will help you get your medications transferred.     Scheduling:  If you have any concerns about today's visit or wish to schedule another appointment please call our office during normal business hours [phone] (8-5:00 M-F)    Contact Us:  Please call [phone] during business hours (8-5:00 M-F).  If after clinic hours, or on the weekend, please call  [phone].    Financial Assistance [phone]  [facility] Billing [phone]  Central Billing Office, [facility]: [phone]  [place] Billing [phone]  Medical Records [phone]  [place] Patient Bill of Rights [email or web address]       MENTAL HEALTH CRISIS NUMBERS:  For a medical emergency please call  911 or go to the nearest ER.     [facility]:   [facility] -[phone]   Crisis Residence [facility]idence -[phone]   Walk-In Counseling Center [facility] -[phone]   COPE 24/7 [place] Mobile Team -[phone] (adults)/[phone] (child)  CHILD: [facility] needs assessment team - [phone]       [facility]:   [facility] - [phone]   Walk-in counseling [facility] - [phone]   Walk-in counseling [facility] - [phone]   Crisis Residence [facility] - [phone]  Urgent Care Adult Mental Ucvswy-[phone] mobile unit/ 24/7 crisis line    National Crisis Numbers:   National Suicide Prevention Lifeline: 7-004-945-TALK (296-936-8139)  Poison Control Center - 7-116-654-1166  [facility]/resources for a list of additional resources (SOS)  Trans Lifeline a hotline for transgender people 6-062-049-8098  [employer] a hotline for LGBT youth 1-710.923.4811  Crisis Text Line: For any crisis 24/7   To: 532659  see www.crisistextline.org  - IF MAKING A CALL FEELS TOO HARD, send a text!         Again thank you for choosing [facility] and please let us know how we can best partner with you to improve you and your family's health.    You may be receiving a survey regarding this appointment. We would love to have your feedback, both positive and negative. The survey is done by an external company, so your answers are anonymous.

## 2022-06-23 NOTE — PROGRESS NOTES
"PSYCHIATRY CLINIC PROGRESS NOTE    30 minute medication management   IDENTIFICATION: Carloz Drummond is a 42 year old male with previous psychiatric diagnoses of autism and PTSD. Pt's sister (pt's guardian) present for ongoing psychiatric follow-up and pt was seen for last transfer of care evaluation on 3/18/2020.  SUBJECTIVE / INTERIM HISTORY     The pt was last seen in clinic 5/11/2022 at which time prazosin was increased. The patient reports fair medication adherence. Since the last visit, he has taken his medication most days as prescribed. He has been without night time medication (prazosin and trazodone for 3-4 nights).     SYMPTOMS include continued poor sleep. He seems very tired during the day. Pura Marina reports that Carloz has been \"off the carts\" with not following directions. Pura Marina states the he has never been violent but more breaking the rules. He has been masturbating more as well if left unattended. No safety concerns reported today.    Current Substance Use- none. Sober support- na     MEDICAL ROS          Reports A comprehensive review of systems was performed and is negative other than noted in the HPI.    PAST MEDICATION TRIALS    Naltrexone 50 mg daily previously prescribed for self-injury, unsure of effectiveness.    Hydroxyzine 50 mg at bedtime, decreased response over time.    Risperdal M-Tabs 1 mg PRN, noted to be ineffective for compulsive behavior (collecting/tearing up paper).  MEDICAL HISTORY      Primary Care Physician: Clinic, [facility] at [address] [place], [place] [id number]     Neurologic Hx:  head injury- none     seizure- none      LOC- none    other- na   Patient Active Problem List   Diagnosis     Autism     ALLERGY     No Known Allergies    MEDICATIONS      Current Outpatient Medications   Medication Sig     ARIPiprazole (ABILIFY) 30 MG tablet Take 1 tablet (30 mg) by mouth daily     lisinopril-hydrochlorothiazide (PRINZIDE/ZESTORETIC) 10-12.5 MG tablet " "Take 1 tablet by mouth daily     prazosin (MINIPRESS) 1 MG capsule Take 3 capsules (3 mg) by mouth At Bedtime     traZODone (DESYREL) 100 MG tablet Take 1 tablet (100 mg) by mouth At Bedtime     No current facility-administered medications for this visit.       Drug Interaction Check is remarkable for:  None  VITALS    There were no vitals taken for this visit.  LABS  use PSYCHLAB______       No results found for any previous visit.       MENTAL STATUS EXAM     Alertness: alert  and oriented  Appearance: unable to assess by telephone  Behavior/Demeanor: unable to assess by telephone,  Speech: 1-2 word phrases, short,some echolalia noted  Language: intact and limited vocabulary  Psychomotor: unable to assess by telephone  Mood:  \"good\"  Affect: unable to assess by telephone;   Thought Process/Associations: perseverative by parent report  Thought Content: no observable suicidal or homicidal ideation  Perception: no  Observable hallucinations  Insight: poor  Judgment: limited  Cognition: does not appear grossly intact; formal cognitive testing was not done       PSYCHOLOGICAL TESTING:     none    ASSESSMENT     Carloz Drummond is a 42 year old male with psychiatric diagnoses of autism and PTSD. He was minimally engaged in the phone call but did answer some questions today, mostly with \"good\". Sleep continues to be disrupted, now complicated by sister not having enough money to have them delivered. She is hoping to get paid tomorrow. He has been off prazosin and trazodone for 3-4 days now. Education given that if sister is unable to get the medication in the next day or so that prazosin will need to be titrated. Did inform sister that Carloz can restart trazodone at the current dose. No medication changes today. Sister to reach out if restarting sleep medication is not helpful. May further adjust prior to next appointment if this is the case. Follow-up planned for one month. Reminded sister of Carloz's upcoming July " appointment with PCP and that Kirsten, clinic , will be calling her to set up transportation. Pura Marina voiced understanding of this.        TREATMENT RISK STATEMENT:  The risks, benefits, alternatives and potential adverse effects have been explained and are understood by the pt and pt's parent(s)/guardian.  Discussion of specific concerns included- N/A. The  pt and pt's parent(s)/guardian agrees to the treatment plan with the ability to do so. The  pt and pt's parent(s)/guardian knows to call the clinic for any problems or access emergency care if needed. There are no medical considerations relevant to treatment, as noted above. Substance use is not a problem as noted above.      Drug interaction check was done for any med changes and is discussed above.      DIAGNOSES                                                                                                    No diagnosis found.                                PLAN                                                                                                 Medication Plan:         -- continue prazosin 3 mg PO Q HS   sent to [employer] mail order       -- continue trazodone 100 mg PO Q HS                 sent to [employer] mail order       -- continue abilify 30 mg PO Q Day                sent to [employer] mail order    Labs:  none    Pt monitor [call for probs]: nothing specific needed    THERAPY: No Change    REFERRALS [CD, medical, other]:  none    :  none    Controlled Substance Contract was not completed    RTC: 1 month    CRISIS NUMBERS: Provided in AVS upon request of patient/guardian.

## 2022-07-06 ENCOUNTER — TELEPHONE (OUTPATIENT)
Dept: PSYCHIATRY | Facility: CLINIC | Age: 42
End: 2022-07-06

## 2022-07-06 NOTE — TELEPHONE ENCOUNTER
SW left message for Pura Marina, patient's guardian and sister. Writer reminded of upcoming appointment with PCP on 7/13 and offered to help set up a ride to appointment. Noted request needed to be completed 3 days before appointment (by Friday). Provided contact number and offered to assist in setting up and also provided number to [facility]:  [phone].    Will follow up on 7/8/22.    YESI Villalobos, [facility]  [phone]

## 2022-07-08 ENCOUNTER — MEDICAL CORRESPONDENCE (OUTPATIENT)
Dept: PSYCHIATRY | Facility: CLINIC | Age: 42
End: 2022-07-08

## 2022-07-08 NOTE — TELEPHONE ENCOUNTER
SW placed call to Pura Marina, patient's sister and guardian. Writer reminded of upcoming PCP appointment and offered to help set up transportation. Pura accepted offer.    Provided number to [facility] for future medical transportation needs: [phone].    Call [facility] with Pura Marina. She provided verbal permission to share information. Writer assisted in setting up transportation for PCP on 7/13. Advocated to get override to allow sister to accompany until necessary paperwork is completed. MTM agreed to one time overtime.     Pura Marina became disconnected during the call. Writer left follow up message confirming ride.    YESI Villalobos, [facility]  [phone]

## 2022-07-11 NOTE — TELEPHONE ENCOUNTER
[facility] Medically Necessary Attendant Form completed and signed by Dr. Rudd on behalf of Dr. Diop. Paperwork returned to [facility] via fax: [phone]. Copy of paperwork sent to scanning.

## 2022-07-12 NOTE — TELEPHONE ENCOUNTER
SW left reminder voicemail for Pura Marina. Reviewed PCP appt July 13 at 3:00 pm. Writer able to complete transportation request, so cab should be at apartment around 2 or 2:15 (writer not given a specific time) to take to clinic.     Provided contact number in case sister had questions.    YESI Villalobos, LICSW  [phone]

## 2022-07-13 ENCOUNTER — TELEPHONE (OUTPATIENT)
Dept: PSYCHIATRY | Facility: CLINIC | Age: 42
End: 2022-07-13

## 2022-07-13 NOTE — TELEPHONE ENCOUNTER
Well shoot. But I think you are right. I will continue to promote that she make the appointment for Carloz at our next check-in. Thanks for the many tries though!    Paulette

## 2022-07-13 NOTE — TELEPHONE ENCOUNTER
Social Work   Incoming Voicemail  [facility]    Incoming Voicemail From:  Pura Drummond, patient's sister and guardian    Content of Voicemail:    Pura Marina did not get writer's message until super late. She is needing to cancel Carloz's appointment for today. She accidentally scheduled another appointment on top of some babysitting for a family member. She is requesting help setting up an appointment for the following week. She provided permission to talk to the doctors about information.    Response/Plan:    SW returned call. Acknowledged voicemail. Writer reviewed attempts to help family set up PCP appointments on several occasions and it does not appear current process of writer helping set up meetings is effective for family. Writer is encouraging Pura Marina to set up next appointment at a time that is convenient for her. Provided name and number of PCP and number for transportation, letting her know she needs to call at least 3 days ahead and that clinic completed form requesting that she be allowed to ride with Carloz.    [facility] Transportation: [phone]  PCP Dr. Cornelius Prince  [address]  [phone]    Provided contact number if she had further questions.      Will route to patient's current psychiatric provider(s) as an FYI.   Please call or EPIC message with any questions or concerns.    Aria Mckoy, MSW, LICSW

## 2022-08-11 ENCOUNTER — TELEPHONE (OUTPATIENT)
Dept: PSYCHIATRY | Facility: CLINIC | Age: 42
End: 2022-08-11

## 2022-08-11 NOTE — TELEPHONE ENCOUNTER
Call placed to preferred number for scheduled telephone visit but unable to reach. Detailed voicemail left with instruction to return call/reschedule appointment. Clinic number provided.

## 2022-10-24 DIAGNOSIS — F43.10 PTSD (POST-TRAUMATIC STRESS DISORDER): ICD-10-CM

## 2022-10-24 DIAGNOSIS — F84.0 AUTISM: ICD-10-CM

## 2022-10-24 RX ORDER — PRAZOSIN HYDROCHLORIDE 1 MG/1
3 CAPSULE ORAL AT BEDTIME
Qty: 90 CAPSULE | Refills: 0 | Status: SHIPPED | OUTPATIENT
Start: 2022-10-24 | End: 2022-12-01

## 2022-10-24 RX ORDER — TRAZODONE HYDROCHLORIDE 100 MG/1
100 TABLET ORAL AT BEDTIME
Qty: 30 TABLET | Refills: 0 | Status: SHIPPED | OUTPATIENT
Start: 2022-10-24 | End: 2022-10-27

## 2022-10-24 NOTE — TELEPHONE ENCOUNTER
Irvin Caldwell,     I talked to Pura Marina and was able to get Carloz scheduled this Thursday in a BALA slot if you approve of that slot being used.  Carloz has been out of meds for 2 nights and Pura Marina wants to discuss increasing trazodone with you at his next appointment.  Ok to use BALA slot on Thursday at 2pm?  Ok to refill in the meantime?    Thanks, Amaya

## 2022-10-24 NOTE — TELEPHONE ENCOUNTER
Called Pura Marina back to confirm that patient is still taking Abilify and they have plenty left.

## 2022-10-24 NOTE — TELEPHONE ENCOUNTER
"Refill request received from: pharmacy    Last appointment: 6/23/2022    RTC: 1 month    Canceled appointments: 9/7/2022    No Showed appointments: 8/11/2022    Follow up scheduled: 0    Requested medication(s) (copy and paste last order information):    Disp Refills Start End KANWAL   prazosin (MINIPRESS) 1 MG capsule 90 capsule 2 6/23/2022  No   Sig - Route: Take 3 capsules (3 mg) by mouth At Bedtime - Oral   Sent to pharmacy as: Prazosin HCl 1 MG Oral Capsule (MINIPRESS)   Class: E-Prescribe   Order: [id number]   E-Prescribing Status: Receipt confirmed by pharmacy (6/23/2022  5:50 PM CDT)      Disp Refills Start End KANWAL   traZODone (DESYREL) 100 MG tablet 30 tablet 2 6/23/2022  No   Sig - Route: Take 1 tablet (100 mg) by mouth At Bedtime - Oral   Sent to pharmacy as: traZODone HCl 100 MG Oral Tablet (DESYREL)   Class: E-Prescribe   Order: [id number]   E-Prescribing Status: Receipt confirmed by pharmacy (6/23/2022  5:50 PM CDT)         Date medication last filled per outside med information: 9/20/2022 for 30 d/s    Months of medication pended per [facility] refill protocol: 1    Request was sent to RNCC for approval    If patient is due for follow up \"Appointment required for further refills [phone]\" was placed in the sig of the medication and encounter was routed to scheduling pool to encourage follow up.     Medication pended by: Faith Medina CMA    "

## 2022-10-27 ENCOUNTER — VIRTUAL VISIT (OUTPATIENT)
Dept: PSYCHIATRY | Facility: CLINIC | Age: 42
End: 2022-10-27
Payer: MEDICAID

## 2022-10-27 DIAGNOSIS — F84.0 AUTISM SPECTRUM DISORDER: ICD-10-CM

## 2022-10-27 DIAGNOSIS — F43.10 PTSD (POST-TRAUMATIC STRESS DISORDER): ICD-10-CM

## 2022-10-27 DIAGNOSIS — F84.0 AUTISM: Primary | ICD-10-CM

## 2022-10-27 PROCEDURE — 99443 PR PHYSICIAN TELEPHONE EVALUATION 21-30 MIN: CPT | Mod: TEL | Performed by: NURSE PRACTITIONER

## 2022-10-27 RX ORDER — TRAZODONE HYDROCHLORIDE 100 MG/1
150 TABLET ORAL AT BEDTIME
Qty: 45 TABLET | Refills: 2 | Status: SHIPPED | OUTPATIENT
Start: 2022-10-27 | End: 2023-02-03

## 2022-10-27 RX ORDER — ARIPIPRAZOLE 30 MG/1
30 TABLET ORAL DAILY
Qty: 30 TABLET | Refills: 2 | Status: SHIPPED | OUTPATIENT
Start: 2022-10-27 | End: 2022-12-01

## 2022-10-27 NOTE — PROGRESS NOTES
"Carloz Drummond is a 42 year old male who is being evaluated via a billable video visit.        TELEPHONE VISIT  Carloz Drummond is a 42 year old who is being evaluated via a billable telephone visit.      Telehealth Details  Type of service:  medication management  Time of service:    Start Time:  2:05     End Time:  2:27    Reason for Telehealth Visit: Patient has requested telehealth visit  Originating Site (patient location):  [facility]   Location- Patient's homePatient's home  Distant Site (provider location):  Off-site  Mode of Communication:  Telephone    PSYCHIATRY CLINIC PROGRESS NOTE    30 minute medication management   IDENTIFICATION: Carloz Drummond is a 42 year old male with previous psychiatric diagnoses of autism and PTSD. Pt's sister (pt's guardian) present for ongoing psychiatric follow-up and pt was seen for last transfer of care evaluation on 3/18/2020.  SUBJECTIVE / INTERIM HISTORY     The pt was last seen in clinic 6/23/2022 at which time no medication changes were made. The patient reports fair medication adherence. Since the last visit, Elieser has taken his medication most days as prescribed. He has not taken his medication for the past few days as family continues to struggle to  medications.     SYMPTOMS include ongoing difficulty with sleep. Ambrosio Marina reports that Elieser does not sleep well at night and will sleep only 35-45 minutes at a stretch. She feels that sleep has worsened in the past two months. Elieser often has nightmares. Sister feels that nighttime medications are somewhat helpful, but Elieser is able to \"fight them\" and not fall asleep. He has been attending work program but sister feels that inconsistent expectations between this environment and the home environment have led to behavior struggles. She continues to report occasional aggression from Carloz at home as well as ongoing masturbation when he is left unattended.     Current Substance Use- denies. Sober support- " "na    MEDICAL ROS          Reports A comprehensive review of systems was performed and is negative other than noted in the HPI.     PAST MEDICATION TRIALS    Naltrexone 50 mg daily previously prescribed for self-injury, unsure of effectiveness.    Hydroxyzine 50 mg at bedtime, decreased response over time.    Risperdal M-Tabs 1 mg PRN, noted to be ineffective for compulsive behavior (collecting/tearing up paper).  MEDICAL HISTORY      Primary Care Physician: [facility] at [address] [place] [address]     Neurologic Hx:  head injury- none    seizure- none      LOC- none    other- na  Patient Active Problem List   Diagnosis     Autism     ALLERGY     No Known Allergies    MEDICATIONS      Current Outpatient Medications   Medication Sig     ARIPiprazole (ABILIFY) 30 MG tablet Take 1 tablet (30 mg) by mouth daily     lisinopril-hydrochlorothiazide (PRINZIDE/ZESTORETIC) 10-12.5 MG tablet Take 1 tablet by mouth daily     prazosin (MINIPRESS) 1 MG capsule Take 3 capsules (3 mg) by mouth At Bedtime Appointment required for further refills [phone]     traZODone (DESYREL) 100 MG tablet Take 1 tablet (100 mg) by mouth At Bedtime Appointment required for further refills [phone]     No current facility-administered medications for this visit.       Drug Interaction Check is remarkable for:  None  VITALS    There were no vitals taken for this visit.  LABS  use PSYCHLAB______       No results found for any previous visit.     MENTAL STATUS EXAM     Alertness: alert  and oriented  Appearance: unable to assess by telephone  Behavior/Demeanor: unable to assess by telephone,  Speech: 1-2 word phrases, short,some echolalia noted  Language: intact and limited vocabulary  Psychomotor: unable to assess by telephone  Mood:  \"good\"  Affect: unable to assess by telephone;   Thought Process/Associations: perseverative by parent report  Thought Content: no observable suicidal or homicidal " "ideation  Perception: no  Observable hallucinations  Insight: poor  Judgment: limited  Cognition: does not appear grossly intact; formal cognitive testing was not done       PSYCHOLOGICAL TESTING:     None    ASSESSMENT     Carloz Drummond is a 42 year old male with psychiatric diagnoses of autism and PTSD. Carloz was present but minimally engaged in the phone call today. He did answer some questions, mostly with \"yeah\" or \"good.\" Sleep continues to be disrupted. This is complicated by the family's struggles with picking up and/or affording prescriptions. When Elieser does take trazodone it is somewhat helpful but not fully effective. Plan to increase trazodone to 150 mg daily to target sleep. Follow up in one month. Ambrosio Salas to reach out sooner with any questions, concerns, or if she feels an earlier appointment is necessary      TREATMENT RISK STATEMENT:  The risks, benefits, alternatives and potential adverse effects have been explained and are understood by the pt and pt's parent(s)/guardian.  Discussion of specific concerns included- N/A. The  pt and pt's parent(s)/guardian agrees to the treatment plan with the ability to do so. The  pt and pt's parent(s)/guardian knows to call the clinic for any problems or access emergency care if needed. There are no medical considerations relevant to treatment, as noted above. Substance use is not a problem as noted above.      Drug interaction check was done for any med changes and is discussed above.      DIAGNOSES                                                                                                      Encounter Diagnoses   Name Primary?     Autism Yes     PTSD (post-traumatic stress disorder)                                PLAN                                                                                                 Medication Plan:         -- increase trazodone to 150mg PO Q HS     sent        -- continue prazosin 3 mg PO Q HS                 not sent, " not needed       -- continue abilify 30 mg PO Q Day                sent     Labs:  none    Pt monitor [call for probs]: nothing specific needed    THERAPY: No Change    REFERRALS [CD, medical, other]:  none    :  none    Controlled Substance Contract was not completed    RTC: 1 month    CRISIS NUMBERS: Provided in AVS upon request of patient/guardian.

## 2022-10-27 NOTE — PATIENT INSTRUCTIONS
**For crisis resources, please see the information at the end of this document**   Patient Education    Thank you for coming to the [facility].    Lab Testing:  If you had lab testing today and your results are reassuring or normal they will be mailed to you or sent through [employer] within 7 days. If the lab tests need quick action we will call you with the results. The phone number we will call with results is # [phone] (home) . If this is not the best number please call our clinic and change the number.    Medication Refills:  If you need any refills please call your pharmacy and they will contact us. Our fax number for refills is [phone]. Please allow three business for refill processing. If you need to  your refill at a new pharmacy, please contact the new pharmacy directly. The new pharmacy will help you get your medications transferred.     Scheduling:  If you have any concerns about today's visit or wish to schedule another appointment please call our office during normal business hours [phone] (8-5:00 M-F)    Contact Us:  Please call [phone] during business hours (8-5:00 M-F).  If after clinic hours, or on the weekend, please call  [phone].    Financial Assistance [phone]  [facility] Billing [phone]  Central Billing Office, [facility]: [phone]  [place] Billing [phone]  Medical Records [phone]  [place] Patient Bill of Rights [email or web address]       MENTAL HEALTH CRISIS NUMBERS:  For a medical emergency please call  911 or go to the nearest ER.     [facility]:   [facility] -[phone]   Crisis Residence [facility] Residence -[phone]   Walk-In Counseling Center [facility] -[phone]   COPE 24/7 [place] Mobile Team -[phone] (adults)/[phone] (child)  CHILD: [facility] needs assessment team - [phone]       [facility]:   [facility] - [phone]   Walk-in counseling [facility] - [phone]   Walk-in counseling [facility] - [phone]   Crisis Residence [facility] Residence - [phone]  Urgent Care Adult Mental Cwl[facility]n-[phone] mobile unit/ 24/7 crisis line    National Crisis Numbers:   National Suicide Prevention Lifeline: 2-173-603-TALK (110-132-2867)  Poison Control Center - 6-195-817-8167  [employer]/resources for a list of additional resources (SOS)  Trans Lifeline a hotline for transgender people 6-794-850-1538  [employer] a hotline for LGBT youth 1-209.443.3845  Crisis Text Line: For any crisis 24/7   To: 820527  see www.crisistextline.org  - IF MAKING A CALL FEELS TOO HARD, send a text!         Again thank you for choosing [facility] and please let us know how we can best partner with you to improve you and your family's health.    You may be receiving a survey regarding this appointment. We would love to have your feedback, both positive and negative. The survey is done by an external company, so your answers are anonymous.

## 2022-12-01 ENCOUNTER — TELEPHONE (OUTPATIENT)
Dept: PSYCHIATRY | Facility: CLINIC | Age: 42
End: 2022-12-01

## 2022-12-01 ENCOUNTER — VIRTUAL VISIT (OUTPATIENT)
Dept: PSYCHIATRY | Facility: CLINIC | Age: 42
End: 2022-12-01
Payer: MEDICAID

## 2022-12-01 DIAGNOSIS — F43.10 PTSD (POST-TRAUMATIC STRESS DISORDER): ICD-10-CM

## 2022-12-01 DIAGNOSIS — F84.0 AUTISM: Primary | ICD-10-CM

## 2022-12-01 DIAGNOSIS — F84.0 AUTISM SPECTRUM DISORDER: ICD-10-CM

## 2022-12-01 PROCEDURE — 10000001 PR ERRONEOUS ENCOUNTER--DISREGARD: Mod: 93 | Performed by: NURSE PRACTITIONER

## 2022-12-01 RX ORDER — PRAZOSIN HYDROCHLORIDE 1 MG/1
CAPSULE ORAL
Qty: 90 CAPSULE | Refills: 0 | Status: SHIPPED | OUTPATIENT
Start: 2022-12-01 | End: 2023-02-03

## 2022-12-01 RX ORDER — ARIPIPRAZOLE 10 MG/1
TABLET ORAL
Qty: 60 TABLET | Refills: 0 | Status: SHIPPED | OUTPATIENT
Start: 2022-12-01 | End: 2023-04-05

## 2022-12-01 NOTE — TELEPHONE ENCOUNTER
Irvin Caldwell,     For some reason, pharmacy just didn't have patient's insurance on file.  Trazodone went through just fine after I gave them the insurance info. How should Abilify and Prazosin be restarted? Should I call and talk Pura Marina through the retitration?    Thanks, Amaya

## 2022-12-01 NOTE — TELEPHONE ENCOUNTER
Yes. Please call Ambrosio Marina and talk through this. Lets restart abilify at 10 mg and increase every 2 weeks until he is back up to 30 mg. Carloz has tolerated this once before but please education Ambrosio Marina on signs and symptoms of NMS and to take Carloz to the hospital if he shows any. Prazosin can be restarted at 1 mg per week. Can you pend these for me?    Thanks,  Paulette

## 2022-12-01 NOTE — PATIENT INSTRUCTIONS
**For crisis resources, please see the information at the end of this document**   Patient Education    Thank you for coming to the [facility].    Lab Testing:  If you had lab testing today and your results are reassuring or normal they will be mailed to you or sent through [employer] within 7 days. If the lab tests need quick action we will call you with the results. The phone number we will call with results is # [phone] (home) . If this is not the best number please call our clinic and change the number.    Medication Refills:  If you need any refills please call your pharmacy and they will contact us. Our fax number for refills is [phone]. Please allow three business for refill processing. If you need to  your refill at a new pharmacy, please contact the new pharmacy directly. The new pharmacy will help you get your medications transferred.     Scheduling:  If you have any concerns about today's visit or wish to schedule another appointment please call our office during normal business hours [phone] (8-5:00 M-F)    Contact Us:  Please call [phone] during business hours (8-5:00 M-F).  If after clinic hours, or on the weekend, please call  [phone].    Financial Assistance [phone]  [facility] Billing [phone]  Central Billing Office, [facility]: [phone]  [place] Billing [phone]  Medical Records [phone]  [place] Patient Bill of Rights [email or web address]       MENTAL HEALTH CRISIS NUMBERS:  For a medical emergency please call  911 or go to the nearest ER.     [facility]:   [facility] -[phone]   Crisis Residence [facility] Residence -[phone]   Walk-In Counseling Center [facility] -[phone]   COPE 24/7 [place] Mobile Team -[phone] (adults)/[phone] (child)  CHILD: [facility] needs assessment team - [phone]       [facility]:   [facility] - [phone]   Walk-in counseling [facility] - [phone]   Walk-in counseling [facility] - [phone]   Crisis Residence [facility] Residence - [phone]  Urgent Care Adult Mental Slks[facility]-[phone] mobile unit/ 24/7 crisis line    National Crisis Numbers:   National Suicide Prevention Lifeline: 4-906-107-TALK (081-605-7180)  Poison Control Center - 8-815-754-4853  [employer]/resources for a list of additional resources (SOS)  Trans Lifeline a hotline for transgender people 3-345-075-2193  The [employer] a hotline for LGBT youth 1-827.612.9473  Crisis Text Line: For any crisis 24/7   To: 749667  see www.crisistextline.org  - IF MAKING A CALL FEELS TOO HARD, send a text!         Again thank you for choosing [facility] and please let us know how we can best partner with you to improve you and your family's health.    You may be receiving a survey regarding this appointment. We would love to have your feedback, both positive and negative. The survey is done by an external company, so your answers are anonymous.

## 2022-12-01 NOTE — PROGRESS NOTES
Pt was roomed but not seen due to sister, Ambrosio Marina, (guardian) being sick. Sister provided brief update that there seemed to be an issue with Carty's insurance and the pharmacy was not dispensing medication. He has now been off all medication for over a week or two. Will reach out to nursing and SW for assistance with contacting pharmacy for more information. Education given to Ambrosio Marina that medications will need to be titrated and cannot be restarted at current doses.

## 2022-12-01 NOTE — TELEPHONE ENCOUNTER
M for Pura Marina requesting a call back ASAP to discuss the bellow information in restarting the Abilify.  Medications pended and routed to provider for signature    INSTRUCTIONS FOR USE OF ANTIPSYCHOTIC MEDICATIONS      DO:    - call clinic if you, or another provider, makes any medication changes  - call clinic if you start or stop smoking tobacco  - call clinic if you experience any symptom listed below    DO NOT:  stop this med abruptly      FOOD:    it depends on the specific drug, ask the provider      COMMON ADVERSE EFFECTS: restlessness, agitation, anxiety, drowsiness, weight gain, headache, nausea, constipation, tremor, dizziness       CALL CLINIC URGENTLY or EMERGENCY ROOM:  if you have any concerns, especially the following...    Movement problems:  - severe stiffness  - severe shakes (tremor)  - muscle spasm  - muscles feel 'funny'  - unusual movement  - problems walking  - uncontrollable need to pace or move around    Flu-like problems:  - excessive sweating  - high fever  - confusion   - headache  - nausea, diarrhea  - inability to stay awake and alert    Throat and/or Chest problems:  - chest discomfort  - trouble breathing  - racing heart  - tight throat   - trouble swallowing    Impulse-control problems, rare but serious:  - gambling urges  - compulsive eating  - uncontrolled spending  - sexual urges     Other problems:  - excessive fluid intake  - excessive urination  - rash  or  other skin problem  - weight gain  - dizziness especially upon standing  - thoughts of death or hurting yourself      FOR WEIGHT CONTROL:      Eliminate all sugared drinks and juice--water and zero calorie beverages only.    Eat a normal portion of food, do not take seconds.                                                                                       The medication makes you unable to know when you are full.    Have healthy snacks available (fruits and vegetables)    Use as little caffeine as  possible    Exercise 3-4 times/week, at least 20 minutes at a time.

## 2022-12-02 RX ORDER — ARIPIPRAZOLE 20 MG/1
20 TABLET ORAL DAILY
Qty: 14 TABLET | Refills: 0 | Status: SHIPPED | OUTPATIENT
Start: 2022-12-16 | End: 2023-04-05

## 2022-12-02 RX ORDER — ARIPIPRAZOLE 30 MG/1
30 TABLET ORAL DAILY
Qty: 30 TABLET | Refills: 0 | Status: SHIPPED | OUTPATIENT
Start: 2022-12-30 | End: 2023-04-05

## 2022-12-02 NOTE — TELEPHONE ENCOUNTER
Sister called back and NMS signs and symptoms reviewed with her.  She verbalized understanding of what to monitor for and the necessity of slow re-titration of the medication.  Writer invited her to call with any questions or concerns in the coming weeks.    Abilify rewritten for 20mg tablets and 30mg tablets since insurance will only cover 1 tablet per day.

## 2022-12-02 NOTE — TELEPHONE ENCOUNTER
Received notice from pharmacy that insurance will not allow more than 1 tablet per day of Abilify.

## 2023-02-09 ENCOUNTER — PATIENT OUTREACH (OUTPATIENT)
Dept: CARE COORDINATION | Facility: CLINIC | Age: 43
End: 2023-02-09
Payer: MEDICAID

## 2023-02-09 NOTE — PROGRESS NOTES
Clinic Care Coordination Contact  [facility]       Clinical Data: Care Coordinator Outreach  Outreach attempted x 1. Phone continuously rang and never went to [facility].   Plan: Care Coordinator will try to reach patient again in 1-2 business days.    Aye Squires [facility]  Clinic Care Coordination  [facility]  [email or web address]  [phone]

## 2023-02-09 NOTE — LETTER
[facility] CARE COORDINATION  [facility]  February 15, 2023    Carloz Drummond  [address]  [address]  [place] [address]      Dear Carloz,    I am a clinic care coordinator who works with Delicia Diop NP, with the [facility]. I have been trying to reach you recently to introduce Clinic Care Coordination. Below is a description of clinic care coordination and how I can further assist you.       The clinic care coordination team is made up of a registered nurse, , and financial resource worker who understand the health care system. The goal of clinic care coordination is to help you manage your health and improve access to the health care system. Our team works alongside your provider to assist you in determining your health and social needs. We can help you obtain health care and community resources, providing you with necessary information and education. We can work with you through any barriers and develop a care plan that helps coordinate and strengthen the communication between you and your care team.    Please feel free to contact me with any questions or concerns regarding care coordination and what we can offer.      We are focused on providing you with the highest-quality healthcare experience possible.    Sincerely,     VANESSA Garza  Clinic Care Coordination  [facility]  [email or web address]  [phone]

## 2023-02-15 NOTE — PROGRESS NOTES
Clinic Care Coordination Contact  [facility]      Clinical Data: Care Coordinator Outreach  Outreach attempted x 2. Phone continuously rang and never went to [facility].   Plan: Care Coordinator will send unable to contact letter with care coordinator contact information via mail. Care Coordinator will try to reach patient again in 3-5 business days.    VANESSA Garza  Clinic Care Coordination  [facility]  [email or web address]  [phone]

## 2023-02-21 NOTE — PROGRESS NOTES
Clinic Care Coordination Contact  [facility]/Voic[facility]       Clinical Data: Care Coordinator Outreach  Outreach attempted x 3. Phone continuously rang, so I was unable to leave a voicemail.  Plan: Care Coordinator will try to reach patient again in 10 business days.    Aye [facility]ires [facility]  Clinic Care Coordination  [facility]  [email or web address]  [phone]

## 2023-03-07 NOTE — PROGRESS NOTES
Clinic Care Coordination Contact  [facility]/Voicemail       Clinical Data: Care Coordinator Outreach  Outreach attempted x 4. Phone continuously reang, so I was unable to leave a voicemail.  Plan: Care Coordinator will try to reach patient again in 10 business days.    Aye Squires [facility]  Clinic Care Coordination  [facility]  [email or web address]  [phone]

## 2023-03-10 ENCOUNTER — TELEPHONE (OUTPATIENT)
Dept: PSYCHIATRY | Facility: CLINIC | Age: 43
End: 2023-03-10
Payer: MEDICAID

## 2023-03-10 NOTE — TELEPHONE ENCOUNTER
Last seen: 10/27  RTC: 1 month  Cancel: 12/1 guardian only  No-show: none  Next appt: 3/15      Disp Refills Start End KANWAL   traZODone (DESYREL) 100 MG tablet 45 tablet 0 2/6/2023  No   Sig - Route: Take 1.5 tablets (150 mg) by mouth At Bedtime APPOINTMENT REQUIRED FOR ADDITIONAL REFILLS [phone] - Oral   Sent to pharmacy as: traZODone HCl 100 MG Oral Tablet (DESYREL)   Class: E-Prescribe   Order: [id number]   E-Prescribing Status: Receipt confirmed by pharmacy (2/6/2023  9:38 AM CST)      Disp Refills Start End KANWAL   prazosin (MINIPRESS) 1 MG capsule 90 capsule 0 2/6/2023  No   Sig - Route: Take 3 capsules (3 mg) by mouth At Bedtime APPOINTMENT REQUIRED FOR ADDITIONAL REFILLS [phone] - Oral   Sent to pharmacy as: Prazosin HCl 1 MG Oral Capsule (MINIPRESS)   Class: E-Prescribe   Order: [id number]   E-Prescribing Status: Receipt confirmed by pharmacy (2/6/2023  9:38 AM CST)     Last refilled per outside med tab: 12/8 #30    Per chart review, refills should still be on file with pharmacy.  Called pharmacy who confirmed that prescriptions had been filled in early February and never picked up.  Writer gave go ahead to fill trazodone but hold prazosin until writer can get a hold of family and consult provider.    Was able to get a hold of Pura Marina and was able to remind her that prazosin needs to be restarted at 1mg and to discuss increasing the dose with Paulette at next week's appointment. Pura Marina verbalized understanding that patient would restart at 1mg of prazosin and they would follow-up with Paulette at scheduled appointment next week. Writer also told Puratrudy Marina that Social Work had been trying to reach out to support them and Pura Marina requested that they try to reach out again because she is struggling to maintain guardianship for patient but she is trying very hard.    Called pharmacy back and asked them to fill prazosin prescription.

## 2023-03-10 NOTE — TELEPHONE ENCOUNTER
[facility] Call Center    Phone Message    May a detailed message be left on voicemail: yes     Reason for Call: Medication Refill Request    Has the patient contacted the pharmacy for the refill? Yes   Name of medication being requested: Prazosin HCl 1 MG Oral Capsule (MINIPRESS)  traZODone HCl 100 MG Oral Tablet (DESYREL)  Provider who prescribed the medication: Delicia Diop  Pharmacy: [id number] - [place], [place] - [address]  Date medication is needed: 3/13/23   Routed high priority as pt is out of both meds; covering provider please sign      Action Taken: Other: p midb psychiatry    Travel Screening: Not Applicable

## 2023-03-15 ENCOUNTER — VIRTUAL VISIT (OUTPATIENT)
Dept: PSYCHIATRY | Facility: CLINIC | Age: 43
End: 2023-03-15
Payer: MEDICAID

## 2023-03-15 DIAGNOSIS — F84.0 AUTISM: Primary | ICD-10-CM

## 2023-03-15 DIAGNOSIS — F43.10 PTSD (POST-TRAUMATIC STRESS DISORDER): ICD-10-CM

## 2023-03-15 PROCEDURE — 99443 PR PHYSICIAN TELEPHONE EVALUATION 21-30 MIN: CPT | Mod: 93 | Performed by: NURSE PRACTITIONER

## 2023-03-15 NOTE — PATIENT INSTRUCTIONS
**For crisis resources, please see the information at the end of this document**   Patient Education    Thank you for coming to the [facility].     Lab Testing:  If you had lab testing today and your results are reassuring or normal they will be mailed to you or sent through [employer] within 7 days. If the lab tests need quick action we will call you with the results. The phone number we will call with results is # [phone]. If this is not the best number please call our clinic and change the number.     Medication Refills:  If you need any refills please call your pharmacy and they will contact us. Our fax number for refills is [phone].   Three business days of notice are needed for general medication refill requests.   Five business days of notice are needed for controlled substance refill requests.   If you need to change to a different pharmacy, please contact the new pharmacy directly. The new pharmacy will help you get your medications transferred.     Contact Us:  Please call [phone] during business hours (8-5:00 M-F).   If you have medication related questions after clinic hours, or on the weekend, please call [phone].     Financial Assistance [phone]   Medical Records [phone]       MENTAL HEALTH CRISIS RESOURCES:  For a emergency help, please call 911 or go to the nearest Emergency Department.     Emergency Walk-In Options:   EmPATH Unit @ [facility] ([place]): [phone] - Specialized mental health emergency area designed to be calming  [facility] ([place]): [phone]  [facility] Acute Psychiatry Services ([place]): [phone]  [facility]): [phone]    [facility] Crisis Information:   [place]: [phone]  [place]: [phone]  Ashvin (SHAUN) - Adult: [phone]     Child: [phone]  Ronni - Adult: [phone]     Child: [phone]  [place]: [phone]  List of all [place]  [facility] resources:   https://[place].gov/dhs/people-we-serve/adults/health-care/mental-health/resources/crisis-contacts.jsp    National Crisis Information:   Crisis Text Line: Text  [place]  to 804281  Suicide & Crisis Lifeline: 988  National Suicide Prevention Lifeline: 8-598-798-TALK (9-306-338-4698)       For online chat options, visit https://suicidepreventionlifeline.org/chat/  Poison Control Center: 0-841-756-1346  Trans Lifeline: 6-825-321-5464 - Hotline for transgender people of all ages  [employer]: 7-939-449-9697 - Hotline for LGBT youth     For Non-Emergency Support:   Fast Tracker: Mental Health & Substance Use Disorder Resources -   https://www.UCampusn.org/

## 2023-03-15 NOTE — PROGRESS NOTES
Carloz Drummond is a 43 year old male who is being evaluated via a billable telephone visit.      What phone number would you like to be contacted at? [phone]  How would you like to obtain your AVS? by Mail    Phone call duration: 36 minutes  PSYCHIATRY CLINIC PROGRESS NOTE    30 minute medication management   IDENTIFICATION: Carloz Drummond is a 43 year old male with previous psychiatric diagnoses of autism and PTSD. Pt's sister (pt's guardian) present for ongoing psychiatric follow-up and pt was seen for last transfer of care evaluation on 3/18/2020.  SUBJECTIVE / INTERIM HISTORY     The pt was last seen in clinic 10/27/2022 at which time trazodone was increased. The patient reports poor medication adherence. Since the last visit, there have been ongoing issues with medication adherence. Per Pura Marina, the pharmacy did not have his medication and as a result he ended up without it most of his medications. Getting him to sleep has been more difficult. He is up to 20 mg of abilify now. She cannot recall what doses of his nighttime meds he is currently taking.    SYMPTOMS include continued difficulty with sleep and following direction. He continues to stay up late at night, often engaging in masturbation if not given a structured activity to do instead. He continues to be fixated on paper products and cards. Pura Marina is frustrated with his work program because they are not setting limits regarding how many paper products he gets to take. This causes difficulty at home because family has to remove the items, causing Berry to yell and threaten to bite himself. Per Pura Marina, she is able to distract him from this and he has not been a threat to himself or others.     Current Substance Use- none. Sober support- na     MEDICAL ROS          Reports A comprehensive review of systems was performed and is negative other than noted in the HPI.    PAST MEDICATION TRIALS    Naltrexone 50 mg daily previously  prescribed for self-injury, unsure of effectiveness.    Hydroxyzine 50 mg at bedtime, decreased response over time.    Risperdal M-Tabs 1 mg PRN, noted to be ineffective for compulsive behavior (collecting/tearing up paper).  MEDICAL HISTORY      Primary Care Physician: Clinic, [facility] at [address] [place] [id number]     Neurologic Hx:  head injury- none     seizure- none      LOC- none    other- na   Patient Active Problem List   Diagnosis     Autism     ALLERGY     No Known Allergies    MEDICATIONS      Current Outpatient Medications   Medication Sig     ARIPiprazole (ABILIFY) 20 MG tablet Take 1 tablet (20 mg) by mouth daily DO NOT START UNTIL PATIENT HAS TAKEN 10MG DAILY for 2 WEEKS (14 DAYS).     ARIPiprazole (ABILIFY) 30 MG tablet Take 1 tablet (30 mg) by mouth daily DO NOT START UNTIL HAS TAKEN 10MG DAILY for 2 WEEKS (14 DAYS) AND THEN 20MG DAILY FOR 2 WEEKS(14 DAYS).     lisinopril-hydrochlorothiazide (PRINZIDE/ZESTORETIC) 10-12.5 MG tablet Take 1 tablet by mouth daily     prazosin (MINIPRESS) 1 MG capsule Take 3 capsules (3 mg) by mouth At Bedtime APPOINTMENT REQUIRED FOR ADDITIONAL REFILLS [phone]     traZODone (DESYREL) 100 MG tablet Take 1.5 tablets (150 mg) by mouth At Bedtime APPOINTMENT REQUIRED FOR ADDITIONAL REFILLS [phone]     ARIPiprazole (ABILIFY) 10 MG tablet Take 1 tablet (10 mg) by mouth daily for 14 days, THEN 2 tablets (20 mg) daily for 14 days. Then take 3 tablets (30mg) by mouth daily.     No current facility-administered medications for this visit.       Drug Interaction Check is remarkable for:  none  VITALS    There were no vitals taken for this visit.  LABS  use PSYCHLAB______       No results found for any previous visit.       MENTAL STATUS EXAM     Alertness: alert  and oriented  Appearance: unable to assess by telephone  Behavior/Demeanor: unable to assess by telephone,  Speech: 1-2 word phrases, short,some echolalia noted  Language: intact  and limited vocabulary  Psychomotor: unable to assess by telephone  Mood:  more irritable per Pura Salas  Affect: unable to assess by telephone;   Thought Process/Associations: perseverative by parent report  Thought Content: no observable suicidal or homicidal ideation  Perception: no  Observable hallucinations  Insight: poor  Judgment: limited  Cognition: does not appear grossly intact; formal cognitive testing was not done    PSYCHOLOGICAL TESTING:     none    ASSESSMENT     Carloz Drummond is a 43 year old male with psychiatric diagnoses of autism and PTSD. Carloz was present but minimally engaged in the phone call today. He could be heard in the background talking to the TV. Sleep continues to be disrupted. This is complicated by the family's struggles with picking up and/or affording prescriptions and medications needing to retitrated. Pura Marina is hoping nighttime medications can be adjusted again but it is unclear what doses he is currently taking. She was not able to confirm this today so plan made for her to call clinic back tomorrow to inform of current dosing. Will adjust sleep plan from there. Follow-up planned for 1 month. Message sent to  to enroll pt in care coordination. Pura Marina to reach out with any questions, concerns, or if an earlier appointment is needed.   The author of this note documented a reason for not sharing it with the patient.    TREATMENT RISK STATEMENT:  The risks, benefits, alternatives and potential adverse effects have been explained and are understood by the pt and pt's parent(s)/guardian.  Discussion of specific concerns included- N/A. The  pt and pt's parent(s)/guardian agrees to the treatment plan with the ability to do so. The  pt and pt's parent(s)/guardian knows to call the clinic for any problems or access emergency care if needed. There are no medical considerations relevant to treatment, as noted above. Substance use is not a problem as noted above.      Drug  interaction check was done for any med changes and is discussed above.      DIAGNOSES                                                                                                      Encounter Diagnoses   Name Primary?     Autism Yes     PTSD (post-traumatic stress disorder)                                    PLAN                                                                                                 Medication Plan:         -- continue trazodone 150 mg PO Q Day   Pura Marina to call back and confirm dose he has been getting         -- continue prazosin 3 mg PO Q HS   Pura Marina to call back and confirm dose he has been getting        -- continue abilify titration as previously planned to 30 mg PO Q Day   Per chart review, refills not needed today    Labs:  none    Pt monitor [call for probs]: nothing specific needed    THERAPY: No Change    REFERRALS [CD, medical, other]:  none    :  none    Controlled Substance Contract was not completed    RTC: 1 month    CRISIS NUMBERS: Provided in AVS upon request of patient/guardian.

## 2023-03-16 NOTE — PROGRESS NOTES
Clinic Care Coordination Contact  [facility]/Voicemail       Clinical Data: Care Coordinator Outreach  Outreach attempted x 5.  Left message on patient's voicemail with call back information and requested return call.  Plan: Care Coordinator will try to reach patient again in 10 business days.    Aye Squires [facility]  Clinic Care Coordination  [facility]  [email or web address]  [phone]

## 2023-03-30 NOTE — PROGRESS NOTES
Clinic Care Coordination Contact  [facility]/Voicemail       Clinical Data: Care Coordinator Outreach  Outreach attempted x 6.  Left message on patient's voicemail with call back information and requested return call.  Plan: Care Coordinator will try to reach patient again in 10 business days.    Aye Squires [facility]  Clinic Care Coordination  [facility]  [email or web address]  [phone]

## 2023-04-05 DIAGNOSIS — F84.0 AUTISM SPECTRUM DISORDER: ICD-10-CM

## 2023-04-05 RX ORDER — ARIPIPRAZOLE 30 MG/1
30 TABLET ORAL DAILY
Qty: 30 TABLET | Refills: 0 | Status: SHIPPED | OUTPATIENT
Start: 2023-04-05 | End: 2023-04-26

## 2023-04-05 NOTE — TELEPHONE ENCOUNTER
Last seen: 3/15  RTC: 1 month   Cancel: none  No-show: none  Next appt: 4/26     Incoming refill from pharmacy via interface      Medication requested: ARIPiprazole (ABILIFY) 30 MG tablet  Directions: Take 1 tablet (30 mg) by mouth daily   Qty: 30 tablet   Last refilled: 2/3     Per most recent visit note:  continue abilify titration as previously planned to 30 mg PO Q Day    Placed call to patient's sister (guardian) to confirm that he has been taking Abilify 30 mg daily as it has not been filled since 2/3. No answer. LVM requesting call back.

## 2023-04-05 NOTE — TELEPHONE ENCOUNTER
"Sister called back, and was able to verify that patient has been taking ARIPiprazole (ABILIFY) 30 MG tablet and that they are looking for a refill as the pharmacy stated that they had to reach out to clinic for authorization/refill request. They also wanted me to pass along a message stating that provider was going to assist in \"transportation for appointments\", and they were following up on that. No further details were provided.   "

## 2023-04-05 NOTE — TELEPHONE ENCOUNTER
Let's send to . I am not sure what appointments sister is specifically referring to but Carloz has historically used medical transport for appointments.     Thanks,  Paulette

## 2023-04-13 NOTE — PROGRESS NOTES
Clinic Care Coordination Contact  [facility]/Voicemail       Clinical Data: Care Coordinator Outreach  Outreach attempted x 7.  Left message on patient's sister's voicemail with call back information and requested return call.  Plan: Care Coordinator will do no further outreaches at this time.    Aye Squires, [facility]  Clinic Care Coordination  [facility]  [email or web address]  [phone]

## 2023-04-26 ENCOUNTER — VIRTUAL VISIT (OUTPATIENT)
Dept: PSYCHIATRY | Facility: CLINIC | Age: 43
End: 2023-04-26
Payer: MEDICAID

## 2023-04-26 DIAGNOSIS — F43.10 PTSD (POST-TRAUMATIC STRESS DISORDER): ICD-10-CM

## 2023-04-26 DIAGNOSIS — F84.0 AUTISM SPECTRUM DISORDER: ICD-10-CM

## 2023-04-26 DIAGNOSIS — F84.0 AUTISM: ICD-10-CM

## 2023-04-26 PROCEDURE — 99443 PR PHYSICIAN TELEPHONE EVALUATION 21-30 MIN: CPT | Mod: 93 | Performed by: NURSE PRACTITIONER

## 2023-04-26 RX ORDER — TRAZODONE HYDROCHLORIDE 100 MG/1
150 TABLET ORAL AT BEDTIME
Qty: 45 TABLET | Refills: 2 | Status: SHIPPED | OUTPATIENT
Start: 2023-04-26 | End: 2023-08-29

## 2023-04-26 RX ORDER — PRAZOSIN HYDROCHLORIDE 1 MG/1
3 CAPSULE ORAL AT BEDTIME
Qty: 90 CAPSULE | Refills: 2 | Status: SHIPPED | OUTPATIENT
Start: 2023-04-26 | End: 2023-08-29

## 2023-04-26 RX ORDER — ARIPIPRAZOLE 30 MG/1
30 TABLET ORAL DAILY
Qty: 30 TABLET | Refills: 2 | Status: SHIPPED | OUTPATIENT
Start: 2023-04-26 | End: 2023-09-14
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
## 2023-04-26 NOTE — PATIENT INSTRUCTIONS
**For crisis resources, please see the information at the end of this document**   Patient Education    Thank you for coming to the [facility].     Lab Testing:  If you had lab testing today and your results are reassuring or normal they will be mailed to you or sent through [employer] within 7 days. If the lab tests need quick action we will call you with the results. The phone number we will call with results is # [phone]. If this is not the best number please call our clinic and change the number.     Medication Refills:  If you need any refills please call your pharmacy and they will contact us. Our fax number for refills is [phone].   Three business days of notice are needed for general medication refill requests.   Five business days of notice are needed for controlled substance refill requests.   If you need to change to a different pharmacy, please contact the new pharmacy directly. The new pharmacy will help you get your medications transferred.     Contact Us:  Please call [phone] during business hours (8-5:00 M-F).   If you have medication related questions after clinic hours, or on the weekend, please call [phone].     Financial Assistance [phone]   Medical Records [phone]       MENTAL HEALTH CRISIS RESOURCES:  For a emergency help, please call 911 or go to the nearest Emergency Department.     Emergency Walk-In Options:   EmPATH Unit @ [facility] ([place]): [phone] - Specialized mental health emergency area designed to be calming  [facility] ([place]): [phone]  [facility] ([place]): [phone]  [facility]): [phone]    [facility] Crisis Information:   [place]: [phone]  [place]: [phone]  [place] (SHAUN) - Adult: [phone]     Child: [phone]  [place] - Adult: [phone]     Child: [phone]  [place]: [phone]  List of all [place]  [facility] resources:   https://[place].gov/dhs/people-we-serve/adults/health-care/mental-health/resources/crisis-contacts.jsp    National Crisis Information:   Crisis Text Line: Text  [place]  to 811762  Suicide & Crisis Lifeline: 988  National Suicide Prevention Lifeline: 8-151-270-TALK (2-252-822-5290)       For online chat options, visit https://suicidepreventionlifeline.org/chat/  Poison Control Center: 2-459-479-1088  Trans Lifeline: 4-718-900-1319 - Hotline for transgender people of all ages  The [employer] Project: 4-908-086-5166 - Hotline for LGBT youth     For Non-Emergency Support:   Fast Tracker: Mental Health & Substance Use Disorder Resources -   https://www.[employer].org/

## 2023-04-27 ENCOUNTER — PATIENT OUTREACH (OUTPATIENT)
Dept: CARE COORDINATION | Facility: CLINIC | Age: 43
End: 2023-04-27
Payer: MEDICAID

## 2023-04-27 ASSESSMENT — ACTIVITIES OF DAILY LIVING (ADL): DEPENDENT_IADLS:: INDEPENDENT

## 2023-04-27 NOTE — PROGRESS NOTES
Clinic Care Coordination Contact    Clinic Care Coordination Contact  OUTREACH    Referral Information:  Referral Source: Care Team    Primary Diagnosis: Psychosocial    Chief Complaint   Patient presents with     Clinic Care Coordination - Initial      [place] Utilization: [facility] Utilization  Difficulty keeping appointments: No  Compliance Concerns: No  No-Show Concerns: No  No PCP office visit in Past Year: Yes  Utilization    Hospital Admissions  0             ED Visits  0             No Show Count (past year)  1                Current as of: 4/27/2023  2:40 AM            Clinical Concerns:  Current Medical Concerns: Did not discuss.    Current Behavioral Concerns:     [facility] contacted patient's sister/guardian, Pura Marina, to follow up on referral sent by Delicia Diop. Pura Marina states that she is seeking help with setting up transportation for patient's appointments. Pura Marina was provided with the phone number to set up transportation through patient's insurance, [phone]. Additionally, Pura Marina was provided with the scheduling line for the [facility]. Pura Marina was instructed to call and ask to schedule an appointment to establish care for patient.    Pura Marina states the she feels comfortable making these calls on her own. She agreed to reach out if she needs any help.    Education Provided to patient: Pura Marina was provided with the scheduling line for [facility] and the number to schedule transportation through patient's insurance.      Health Maintenance Reviewed: Due/Overdue    Medication Management:  Medication review status: Did not discuss.     Functional Status:  Dependent ADLs: Independent  Dependent IADLs: Independent  Bed or wheelchair confined: No  Mobility Status: Independent  Fallen 2 or more times in the past year?: No  Any fall with injury in the past year?: No    Living Situation:  Current living arrangement: I live in a private home with  family  Type of residence: Apartment    Lifestyle & Psychosocial Needs:    Social Determinants of Health     Tobacco Use: Low Risk  (3/18/2020)    Patient History      Smoking Tobacco Use: Never      Smokeless Tobacco Use: Never      Passive Exposure: Not on file   Alcohol Use: Not on file   Financial Resource Strain: Not on file   Food Insecurity: Not on file   Transportation Needs: Not on file   Physical Activity: Not on file   Stress: Not on file   Social Connections: Not on file   Intimate Partner Violence: Not on file   Depression: Not on file   Housing Stability: Not on file     Diet: Regular  Tube Feeding: No  Transportation means: Public transportation, [employer] mobility, Medical transport     Zoroastrianism or spiritual beliefs that impact treatment: Yes  Chemical Dependency Status: No Current Concerns  Informal Support system: Family      Resources and Interventions:  Current Resources:      Community Resources: County Programs, Financial/Insurance, Transportation Services, OP Mental Health  Supplies Currently Used at Home: None  Equipment Currently Used at Home: none  Employment Status: employed part-time     Advance Care Plan/Directive  Advanced Care Plans/Directives on file: No  Advanced Care Plan/Directive Status: Not Applicable    Referrals Placed: Transportation, Community Resources    Patient/Caregiver understanding: Patient verbalized understanding, engaged in AIDET communication during patient encounter.    Outreach Frequency: 2 weeks  Future Appointments              In 1 month Delicia Diop NP [facility]        Plan: Pura Marina will reach out if any questions or concerns arise prior to my next outreach.    Care Coordinator will follow up in two weeks.    VANESSA Garza  Clinic Care Coordination  [facility]  [email or web address]  [phone]

## 2023-05-08 NOTE — TELEPHONE ENCOUNTER
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 Pt reports back isn't bothering her a lot right now, but she hasn't done much, she has to do some stuff around her house later today and will see how it is after that, states she has back pain with standing and bending over. Changes to:  Meds, Allergies, Med Hx, Sx Hx? If yes, update Summary List no       TREATMENT AREA =  Low back pain [M54.50]  Pain in left shoulder [M25.512]  Pain in left ankle and joints of left foot [M25.572]    OBJECTIVE    Modalities Rationale:     decrease pain and increase tissue extensibility to improve patient's ability to progress to PLOF and address remaining functional goals. 10 min [x] Estim Unattended, type/location: IFC to left shoulder, pt seated                                      []  w/ice    [x]  w/heat    min [] Estim Attended, type/location:                                     []  w/US     []  w/ice    []  w/heat    []  TENS insruct      min []  Mechanical Traction: type/lbs                   []  pro   []  sup   []  int   []  cont    []  before manual    []  after manual    min []  Ultrasound, settings/location:      min []  Iontophoresis w/ dexamethasone, location:                                               []  take home patch       []  in clinic    min  unbill []  Ice     []  Heat    location/position:     min []  Paraffin,  details:     min []  Vasopneumatic Device, press/temp:     min []  Mary Ellen Carrillo / Desiree Maza:     If using vaso (only need to measure limb vaso being performed Refill denied     Note sent to pharmacy--please obtain refills from [facility]

## 2023-05-11 ENCOUNTER — PATIENT OUTREACH (OUTPATIENT)
Dept: CARE COORDINATION | Facility: CLINIC | Age: 43
End: 2023-05-11
Payer: MEDICAID

## 2023-05-11 NOTE — PROGRESS NOTES
Clinic Care Coordination Contact  [facility]      Clinical Data: Care Coordinator Outreach  Outreach attempted x 1. Left message on patient's voicemail with call back information and requested return call.  Plan: Care Coordinator will try to reach patient again in 3-5 business days.    Aye Squires [facility]  Clinic Care Coordination  [facility]  [email or web address]  [phone]

## 2023-05-17 NOTE — PROGRESS NOTES
Clinic Care Coordination Contact    [facility] contacted Pura Marina to follow up. Pura Marina states that she hasn't had a chance to review the information that I sent her, as she has been busy with phone calls for housing. Pura Marina states she still has the phone number to call to schedule transportation through Medical Assistance. Pura Marina will reach out if she needs help with this in the future. Pura Marina denies any further concerns, but agreed to reach out if any needs arise.    [facility] will do no further outreaches at this time.    Aye Squires, hospitals  Clinic Care Coordination  [facility]  [email or web address]  [phone]

## 2023-08-29 ENCOUNTER — TELEPHONE (OUTPATIENT)
Dept: PSYCHIATRY | Facility: CLINIC | Age: 43
End: 2023-08-29
Payer: MEDICAID

## 2023-08-29 NOTE — TELEPHONE ENCOUNTER
SW placed call to Puratrudy Marina to offer help with setting up transportation.     Pura Marina affirmed she would like help, but is currently going through a family emergency and is unable to work on this task right now. She agreed to let writer call her 1 week before the appointment to help in setting up transportation.    Aria Mckoy, MSW, [facility]  [phone]        ----- Message -----  From: Amaya Hernandez RN  Sent: 8/28/2023   1:03 PM CDT  To: Delicia Diop NP; #  Subject: TACOS Curtis,     We've got another scheduled appointment on the books for Carloz for 9/14.  Pura Marina continues to say that she is struggling to get Berry to appointments and keep him on his meds regularly because of transportation.  I am sure we have all done everything we can in this situation but thought we should loop you in Kirsten.    Amaya AGARWAL

## 2023-09-05 NOTE — TELEPHONE ENCOUNTER
Called Pura Marina to set up ride for Carloz to go in person o appointment with Paulette Diop on 9/14. She agreed to call MT with writer. [phone].  Made medical ride for appointment. MTM needs to have statement on file from physician that Carloz is in need of an accompanying rider. Will follow up with provider and clinic.    YESI Villalobos, [facility]  [phone]

## 2023-09-06 NOTE — TELEPHONE ENCOUNTER
Irvin Bingham,    Carty is on my schedule for 9/14 but it is listed as a telephone call. I am willing to make an exception to my schedule for Carty and see him in person on this day though because I think it is really important to at least get a set of vitals. Amaya, if we are able to get transport signed off and set up, let me know and I will plan to come in.    Thanks,  Paulette

## 2023-09-07 NOTE — TELEPHONE ENCOUNTER
No worries, jabier. I do want to see him in person. I just did not realize he had been scheduled for a Thursday but getting eyes on him is super important so I am willing to come in for that.    Paulette

## 2023-09-13 ENCOUNTER — TELEPHONE (OUTPATIENT)
Dept: PSYCHIATRY | Facility: CLINIC | Age: 43
End: 2023-09-13
Payer: MEDICAID

## 2023-09-13 NOTE — TELEPHONE ENCOUNTER
Carloz has appointment with Paulette Diop on 9/14. Writer working with sister/guardian to set up a medical ride to appointment, but has not yet received forms to authorize sister to accompany to appointment.    RAYSA called [facility] transportation and reviewed needs. [facility] provided authorization for tomorrow's ride (and for Pura Marina to ride with). Transportation will be by [employer] [phone]. Writer requested form be emailed to Amaya/RENAN to complete since fax is not arriving.    RAYSA called Pura Marina to remind of appointment and provide info on [employer]. Pura Marina is interested in [facility]'s ride bhavana, writer will forward info to provider and team to give to Pura Marina at appointment tomorrow.    YESI Villalobos, [facility]  [phone]

## 2023-09-13 NOTE — TELEPHONE ENCOUNTER
Thank you, Kirsten! I can try to help out with the bhavana. I will also let rooming staff know that Carloz is coming in in-person.    Paulette

## 2023-09-13 NOTE — LETTER
9/13/2023       RE: Carloz Drummond  [address] [place] [address]  [address]  [place] [address]     To Whom It May Concern,     The following is patient's medication schedule:    Aripiprazole(Abilify): 30mg (1 tablet) every morning    Prazosin(Minipress) 3mg (3 tablets and trazodone(Desyrel) 150mg (1.5tablets)are given in the evening.

## 2023-09-14 ENCOUNTER — PATIENT OUTREACH (OUTPATIENT)
Dept: CARE COORDINATION | Facility: CLINIC | Age: 43
End: 2023-09-14
Payer: MEDICAID

## 2023-09-14 ENCOUNTER — OFFICE VISIT (OUTPATIENT)
Dept: PSYCHIATRY | Facility: CLINIC | Age: 43
End: 2023-09-14
Payer: MEDICAID

## 2023-09-14 VITALS
BODY MASS INDEX: 25.33 KG/M2 | WEIGHT: 152.2 LBS | SYSTOLIC BLOOD PRESSURE: 132 MMHG | DIASTOLIC BLOOD PRESSURE: 91 MMHG | HEART RATE: 52 BPM

## 2023-09-14 DIAGNOSIS — F84.0 AUTISM: ICD-10-CM

## 2023-09-14 DIAGNOSIS — F43.10 PTSD (POST-TRAUMATIC STRESS DISORDER): ICD-10-CM

## 2023-09-14 DIAGNOSIS — F84.0 AUTISM SPECTRUM DISORDER: ICD-10-CM

## 2023-09-14 PROCEDURE — 99214 OFFICE O/P EST MOD 30 MIN: CPT | Performed by: NURSE PRACTITIONER

## 2023-09-14 RX ORDER — ARIPIPRAZOLE 30 MG/1
30 TABLET ORAL DAILY
Qty: 30 TABLET | Refills: 2 | Status: SHIPPED | OUTPATIENT
Start: 2023-09-14 | End: 2024-04-15

## 2023-09-14 RX ORDER — PRAZOSIN HYDROCHLORIDE 1 MG/1
3 CAPSULE ORAL AT BEDTIME
Qty: 90 CAPSULE | Refills: 2 | Status: SHIPPED | OUTPATIENT
Start: 2023-09-14 | End: 2024-01-08

## 2023-09-14 RX ORDER — TRAZODONE HYDROCHLORIDE 100 MG/1
150 TABLET ORAL AT BEDTIME
Qty: 45 TABLET | Refills: 2 | Status: SHIPPED | OUTPATIENT
Start: 2023-09-14 | End: 2024-01-08

## 2023-09-14 NOTE — TELEPHONE ENCOUNTER
Called [facility] pharmacy to request that Trazodone and prazosin be put back on the shelf, which pharmacist stated would happen immediately. Called [facility] and made sure trazodone and prazosin could be put through insurance and they were able to get the medications to go through with a $2 copay.  The pharmacy had a card on file and they were able to run it and medications should be shipped today.  Pura Marina notified via [employer].    When writer met with patient today, [email or web address] transport application was installed and account set up so Pura Marina could login and request rides.  Attempted to get the [employer] bhavana installed on Billie Jeans phone but this application was not even able to be installed due to her phone being too new.

## 2023-09-14 NOTE — TELEPHONE ENCOUNTER
Paulette,     Do you think we still need to write some sort of letter to accompany Carloz to work and respite regarding his medications?    I was a little confused on what this letter should include even though I offered it.    Maybe just a med schedule?    Let me know what you think.    Amaya

## 2023-09-14 NOTE — PATIENT INSTRUCTIONS
**For crisis resources, please see the information at the end of this document**   Patient Education    Thank you for coming to the [facility].    Lab Testing:  If you had lab testing today and your results are reassuring or normal they will be mailed to you or sent through TRAFI within 7 days. If the lab tests need quick action we will call you with the results. The phone number we will call with results is # [phone] (home) . If this is not the best number please call our clinic and change the number.    Medication Refills:  If you need any refills please call your pharmacy and they will contact us. Our fax number for refills is [phone]. Please allow three business for refill processing. If you need to  your refill at a new pharmacy, please contact the new pharmacy directly. The new pharmacy will help you get your medications transferred.     Scheduling:  If you have any concerns about today's visit or wish to schedule another appointment please call our office during normal business hours [phone] (8-5:00 M-F)    Contact Us:  Please call [phone] during business hours (8-5:00 M-F).  If after clinic hours, or on the weekend, please call  [phone].    Financial Assistance [phone]  [facility] Billing [phone]  Central Billing Office, [facility]: [phone]  [place] Billing [phone]  Medical Records [phone]  [place] Patient Bill of Rights [email or web address]        MENTAL HEALTH CRISIS RESOURCES:  For a emergency help, please call 911 or go to the nearest Emergency Department.      Children's Emergency Walk-In Options:   [facility]:  [address], [place], [address]  [facility]:   [address], [address]  [place] - [address], [place], [place],  [address]    Adult Emergency Walk-In Options:  [facility]:  [facility], [place], [address]  [facility]:  [address] [place] [address]  [facility]:  [address], [place] [address]  [facility] :  [address] [place] [address]    [facility] Crisis Information:   Ashvin PRICE) - Adult: [phone]       Child: [phone]  Ronni - Adult: [phone]     Child: [phone]  [place]: [phone]  Wojcie[place]: [phone]  [place]: [phone]    List of all [facility] resources:   https://[place].gov/dhs/people-we-serve/adults/health-care/mental-health/resources/crisis-contacts.jsp     National Crisis Information:   Call or text: '988'  National Suicide Prevention Lifeline: 4-762-737-TALK (1-154.462.6751) - for online chat options, visit https://suicidepreventionlifeline.org/chat/  Poison Control Center: 3-216-372-5164  Trans Lifeline: 3-091-799-0360 - Hotline for transgender people of all ages  The Gennaro Project: 4-290-287-1889 - Hotline for LGBT youth      For Non-Emergency Support:   Fast Tracker: Mental Health & Substance Use Disorder Resources -   https://www.fasttrackermn.org/        Again thank you for choosing [facility] and please let us know how we can best partner with you to improve you and your family's health.    You may be receiving a survey regarding this appointment. We would love to have your feedback, both positive and negative. The survey is done by an external company, so your answers are anonymous.

## 2023-09-14 NOTE — PROGRESS NOTES
Clinic Care Coordination Contact  Clinic Care Coordination Contact  OUTREACH    Referral Information: Delicia Diop    Chief Complaint   Patient presents with    Clinic Care Coordination - Initial      [place] Utilization:    Utilization      Hospital Admissions  0             ED Visits  0             No Show Count (past year)  1                    Current as of: 9/14/2023  3:04 PM              Clinical Concerns:  Current Medical Concerns: Did not discuss.    Current Behavioral Concerns: [facility] contacted Pura Marina to follow up on referral placed by Paulette. Pura Marina states that they are ultimately looking for a different adult day program for patient, but that they would like to wait a few weeks. She states that they have been busy with appointments, and don't have time to look into new resources yet. Pura Salas agreed to a call back from me in two weeks.      Medication Management:  Medication review status: Did not discuss.    Lifestyle & Psychosocial Needs:    Social Determinants of Health     Tobacco Use: Low Risk  (9/14/2023)    Patient History     Smoking Tobacco Use: Never     Smokeless Tobacco Use: Never     Passive Exposure: Not on file   Alcohol Use: Not on file   Financial Resource Strain: Not on file   Food Insecurity: Not on file   Transportation Needs: Not on file   Physical Activity: Not on file   Stress: Not on file   Social Connections: Not on file   Intimate Partner Violence: Not on file   Depression: Not on file   Housing Stability: Not on file     Patient/Caregiver understanding: Patient verbalized understanding, engaged in AIDET communication during patient encounter.     Future Appointments                In 1 month Delicia Diop NP [facility]          Plan: [facility] will follow up with Pura Marina in 10 business days.    VANESSA Garza  Clinic Care Coordination  [facility]  [email or web address]  [phone]

## 2023-09-14 NOTE — PROGRESS NOTES
PSYCHIATRY CLINIC PROGRESS NOTE    30 minute medication management   IDENTIFICATION: Carloz Drummond is a 43 year old male with previous psychiatric diagnoses of autism and PTSD. Pt's sister (pt's guardian) present for ongoing psychiatric follow-up and pt was seen for last transfer of care evaluation on 3/18/2020.   SUBJECTIVE / INTERIM HISTORY     The pt was last seen in clinic 4/26/2023 at which time no medication changes were made. The patient reports poor medication adherence. Since the last visit, sister reports that Carloz has been taking his morning medications recently (Abilify) but has not had his night time medications.He continues to work at [employer] off [address]. Joel Mondragon is . He gets respite at times.     SYMPTOMS include continued difficulty sleeping at night. He has not been able to get his medications due to ongoing issues with the pharmacy. He continues to be loud and fidgety at times but is overall redirectable. He continues with day programming/work at [email or web address]. Carloz has not been aggressive. He can be loud at times. He did recently get very upset and punch himself in the head. No safety concerns reported today.     Current Substance Use- none. Sober support- na     MEDICAL ROS          Reports A comprehensive review of systems was performed and is negative other than noted above.    PAST MEDICATION TRIALS    Naltrexone 50 mg daily previously prescribed for self-injury, unsure of effectiveness.    Hydroxyzine 50 mg at bedtime, decreased response over time.    Risperdal M-Tabs 1 mg PRN, noted to be ineffective for compulsive behavior (collecting/tearing up paper).   MEDICAL HISTORY      Primary Care Physician: Clinic, [facility] at [address] [place] [id number]     Neurologic Hx:  head injury- none     seizure- none      LOC- none    other- na   Patient Active Problem List   Diagnosis    Autism     ALLERGY     No Known Allergies    MEDICATIONS      Current  "Outpatient Medications   Medication Sig    ARIPiprazole (ABILIFY) 30 MG tablet Take 1 tablet (30 mg) by mouth daily    prazosin (MINIPRESS) 1 MG capsule Take 3 capsules (3 mg) by mouth At Bedtime    traZODone (DESYREL) 100 MG tablet Take 1.5 tablets (150 mg) by mouth At Bedtime    lisinopril-hydrochlorothiazide (PRINZIDE/ZESTORETIC) 10-12.5 MG tablet Take 1 tablet by mouth daily     No current facility-administered medications for this visit.       Drug Interaction Check is remarkable for:  None  VITALS    BP (!) 132/91 (BP Location: Right arm, Patient Position: Sitting, Cuff Size: Adult Regular)   Pulse 52   Wt 69 kg (152 lb 3.2 oz)   BMI 25.33 kg/m    LABS  use [email or web address]______       No results found for any previous visit.       MENTAL STATUS EXAM     Alertness: alert  and oriented  Appearance: casually groomed  Behavior/Demeanor: fidgety/restless  Speech: 1-2 word phrases, short,some echolalia noted  Language: intact and limited vocabulary  Psychomotor: unable to assess by telephone  Mood:  \"good\" per Carloz  Affect: unable to assess by telephone;   Thought Process/Associations: perseverative by parent report  Thought Content: no observable suicidal or homicidal ideation  Perception: no  Observable hallucinations  Insight: poor  Judgment: limited  Cognition: does not appear grossly intact; formal cognitive testing was not done    PSYCHOLOGICAL TESTING:     none    ASSESSMENT     Carloz Drummond is a 43 year old male with psychiatric diagnoses of autism and PTSD. Carloz was present and cooperative. He responded well to directions today. He has been getting Abilify regularly but continues to struggle to get night time medications regularly due to ongoing issues with the pharmacy. Will send refills to [facility] mail order pharmacy instead. No changes to medication today. Follow-up planned for 1 month. Vulnerable adult report made regarding Carloz's paycheck being put into account he does not have access    TREATMENT " RISK STATEMENT:  The risks, benefits, alternatives and potential adverse effects have been explained and are understood by the pt and pt's parent(s)/guardian.  Discussion of specific concerns included- N/A. The  pt and pt's parent(s)/guardian agrees to the treatment plan with the ability to do so. The  pt and pt's parent(s)/guardian knows to call the clinic for any problems or access emergency care if needed. There are no medical considerations relevant to treatment, as noted above. Substance use is not a problem as noted above.      Drug interaction check was done for any med changes and is discussed above.    DIAGNOSES                                                                                                      Encounter Diagnoses   Name Primary?    Autism     PTSD (post-traumatic stress disorder)     Autism spectrum disorder                                  PLAN                                                                                                 Medication Plan:         -- continue trazodone 150 mg PO Q Day                 sent to [facility] order pharmacy        -- continue prazosin 3 mg PO Q HS                 sent [facility] order pharmacy       -- continue abilify 30 mg PO Q Day                 sent [facility] order pharmacy    Labs:  none    Pt monitor [call for probs]: nothing specific needed    THERAPY: No Change    REFERRALS [CD, medical, other]:  none    :  none    Controlled Substance Contract was not completed    RTC: 1 month    CRISIS NUMBERS: Provided in AVS upon request of patient/guardian.

## 2023-09-14 NOTE — NURSING NOTE
Chief Complaint   Patient presents with    RECHECK       BP (!) 132/91 (BP Location: Right arm, Patient Position: Sitting, Cuff Size: Adult Regular)   Pulse 52   Wt 69 kg (152 lb 3.2 oz)   BMI 25.33 kg/m      Jessa Pickering, EMT  September 14, 2023

## 2023-09-25 ENCOUNTER — TELEPHONE (OUTPATIENT)
Dept: PSYCHIATRY | Facility: CLINIC | Age: 43
End: 2023-09-25
Payer: MEDICAID

## 2023-09-25 NOTE — TELEPHONE ENCOUNTER
"[facility]    Phone Message    May a detailed message be left on voicemail: yes     Reason for Call: Medication Refill Request    Has the patient contacted the pharmacy for the refill? Yes   Name of medication being requested: trazodone  Provider who prescribed the medication: DILSHAD Lyle CNP  Pharmacy:  [facility] - [place], [place] - [address]    Date medication is needed: ASAP      Patient's sister/legal guardian, Pura Marina, called to report patient has not had his trazodone in a couple weeks. Patient's employer told sister they might need to send him home from work because he has been so \"out of control\" without his medication.    Writer saw the last refill was received by the pharmacy on 9/14/23.     Pura Marina can be reached at [phone] (try first) or [phone] (try second if she cannot be reached at the first number).      Action Taken: Message routed to:  Other: Nurse pool    Travel Screening: Not Applicable                                                                   "

## 2023-09-25 NOTE — TELEPHONE ENCOUNTER
Paulette,     When I spoke to Pura Marina today about Carloz's medications (financial barrier to getting medications- see other encounter), she mentioned that she had concerns about Carloz touching himself in public.  I do remember her bringing this up at the appointment but she both didn't want to talk about it in front of him and she knew there were other priorities.  She is wondering if you have any recommendations for this since family, work and respite have noticed an increased frequency and Pura Marina is worried about him no longer being able to work, attend respite and making the family uncomfortable.    Amaya

## 2023-09-25 NOTE — TELEPHONE ENCOUNTER
Called pharmacy who was unable to process medications requested because the card they have one file is not active.  Pharmacy agreed to reach out to patient to request an updated card number.    Called Pura Marina who communicated that they are unable to afford the copay on patient's medications ($2) and the mail order pharmacy can not send medication if there is not a card on file.  Pura Marina does not have a card that is not overdrawn so she is unable to pay for the medications at this time.    Routed to social work for recommendations on how to proceed.

## 2023-09-26 NOTE — TELEPHONE ENCOUNTER
I was aware of this. I honestly think he needs increased services. I have no immediate thoughts other than that. There have been so many other barriers that I have not been able to start to work on this with KVNG Caldwell

## 2023-09-28 NOTE — TELEPHONE ENCOUNTER
Samia Reid    9/27/23 10:51 AM  Note  Guardian called asking about medication being refilled and was told they would receive a call regarding mediation. I let the guardian know that medication was signed off on 9/14 and had two refills with prescription. Gave guardian number and address of pharmacy.         Writer called the pharmacy who stated that supervisor was able to put through a one time override so that medications were shipped 9/26.    Called Pura Marina who updated:  - niece called to request override at the pharmacy  - patient has not be able to go to work a few days because he was up most of the night or Pura Marina was worried about him stimming too much at work  - Pura Marina also updated that she has not been bringing patient to respite the last couple weeks because patient may not have been supervised appropriately there

## 2023-09-29 ENCOUNTER — PATIENT OUTREACH (OUTPATIENT)
Dept: CARE COORDINATION | Facility: CLINIC | Age: 43
End: 2023-09-29
Payer: MEDICAID

## 2023-09-29 NOTE — PROGRESS NOTES
Clinic Care Coordination Contact  [facility]/Voicemail       Clinical Data: Care Coordinator Outreach  Outreach attempted x 1.  Left message on patient's sister's voicemail with call back information and requested return call.  Plan: Care Coordinator will try to reach patient again in 10 business days.    Aye Squires [facility]  Clinic Care Coordination  [facility]  [email or web address]  [phone]

## 2023-10-05 ENCOUNTER — TELEPHONE (OUTPATIENT)
Dept: PSYCHIATRY | Facility: CLINIC | Age: 43
End: 2023-10-05
Payer: MEDICAID

## 2023-10-05 NOTE — TELEPHONE ENCOUNTER
SW placed call to Pura Marina to follow up on medication needs for Carloz. Writer left voicemail requesting call back if Pura Marina has not been able to resolve concern about affording medications. Provided contact number.    YESI Villalobos, [facility]  [phone]

## 2023-10-18 ENCOUNTER — PATIENT OUTREACH (OUTPATIENT)
Dept: CARE COORDINATION | Facility: CLINIC | Age: 43
End: 2023-10-18
Payer: MEDICAID

## 2023-10-18 NOTE — PROGRESS NOTES
Clinic Care Coordination Contact  Follow Up Progress Note      Assessment: [facility] contacted Pura Marina to follow up. Pura Marina states that patient is doing well. She states that she forgot that patient had an appointment today, and she requested the number for Medicaid Transportation. This was provided to her. Pura Marina also requested the numbers to request a new MHCP card for patient, and the number for clinic scheduling. These numbers were provided to her. Pura Marina denies having any further social service needs at this time.    Care Gaps:    Health Maintenance Due   Topic Date Due    COVID-19 Vaccine (1) Never done    COLORECTAL CANCER SCREENING  Never done    HIV SCREENING  Never done    HEPATITIS C SCREENING  Never done    YEARLY PREVENTIVE VISIT  04/15/2006    LIPID  Never done    DTAP/TDAP/TD IMMUNIZATION (2 - Td or Tdap) 10/12/2021    PHQ-2 (once per calendar year)  Never done    INFLUENZA VACCINE (1) 09/01/2023     Intervention/Education provided during outreach: Patient verbalized understanding, engaged in AIDET communication during patient encounter.    Plan: Pura Marina will reach out with any questions or concerns.    Care Coordinator will do no further outreaches at this time.    Aye Squires, [facility]  Clinic Care Coordination  [facility]  [email or web address]  [phone]

## 2024-01-08 DIAGNOSIS — F84.0 AUTISM: ICD-10-CM

## 2024-01-08 DIAGNOSIS — F43.10 PTSD (POST-TRAUMATIC STRESS DISORDER): ICD-10-CM

## 2024-01-08 RX ORDER — TRAZODONE HYDROCHLORIDE 100 MG/1
150 TABLET ORAL AT BEDTIME
Qty: 45 TABLET | Refills: 2 | Status: SHIPPED | OUTPATIENT
Start: 2024-01-08 | End: 2024-04-19

## 2024-01-08 RX ORDER — PRAZOSIN HYDROCHLORIDE 1 MG/1
3 CAPSULE ORAL AT BEDTIME
Qty: 90 CAPSULE | Refills: 2 | Status: SHIPPED | OUTPATIENT
Start: 2024-01-08 | End: 2024-04-19

## 2024-04-15 DIAGNOSIS — F84.0 AUTISM SPECTRUM DISORDER: ICD-10-CM

## 2024-04-15 DIAGNOSIS — F84.0 AUTISM: ICD-10-CM

## 2024-04-15 DIAGNOSIS — F43.10 PTSD (POST-TRAUMATIC STRESS DISORDER): ICD-10-CM

## 2024-04-15 RX ORDER — PRAZOSIN HYDROCHLORIDE 1 MG/1
3 CAPSULE ORAL AT BEDTIME
Qty: 90 CAPSULE | Refills: 2 | Status: CANCELLED | OUTPATIENT
Start: 2024-04-15

## 2024-04-15 RX ORDER — TRAZODONE HYDROCHLORIDE 100 MG/1
150 TABLET ORAL AT BEDTIME
Qty: 45 TABLET | Refills: 2 | Status: CANCELLED | OUTPATIENT
Start: 2024-04-15

## 2024-04-15 NOTE — TELEPHONE ENCOUNTER
Call placed to sister at preferred number on file. Unable to reach, lvm with clinic number for callback and request to leave some good times for call back.

## 2024-04-15 NOTE — TELEPHONE ENCOUNTER
"Refill request received from: [facility]    Last appointment: 9/14/2023    RTC: 1 month    Canceled appointments: 0    No Showed appointments: 10/18/2024    Follow up scheduled: 0    Requested medication(s) (copy and paste last order information):     Disp Refills Start End KANWAL    ARIPiprazole (ABILIFY) 30 MG tablet 30 tablet 2 9/14/2023 -- No   Sig - Route: Take 1 tablet (30 mg) by mouth daily - Oral   Sent to pharmacy as: ARIPiprazole 30 MG Oral Tablet (ABILIFY)   Class: E-Prescribe   Order: [id number]   E-Prescribing Status: Receipt confirmed by pharmacy (9/14/2023 11:42 AM CDT)       Date medication last filled per outside med information:1/10/2024 for 30 d/s      Months of medication pended per [facility] refill protocol: 0    Request was sent to Paulette Diop for approval    If patient is due for follow up \"Appointment required for further refills [phone]\" was placed in the sig of the medication and encounter was routed to scheduling pool to encourage follow up.     Medication pended by: Faith Medina RN    "

## 2024-04-15 NOTE — TELEPHONE ENCOUNTER
You will need to reach out to sister and see when he last took abilify. She struggles to know which med is which. If there has been a gap, we will need to retitrate. Let me know what you are able to find out from sister.  Thanks,  Paulette

## 2024-04-18 DIAGNOSIS — F84.0 AUTISM: ICD-10-CM

## 2024-04-18 DIAGNOSIS — F43.10 PTSD (POST-TRAUMATIC STRESS DISORDER): ICD-10-CM

## 2024-04-18 NOTE — TELEPHONE ENCOUNTER
[facility]    Phone Message    May a detailed message be left on voicemail: yes     Reason for Call: Medication Refill Request    Has the patient contacted the pharmacy for the refill? Yes   Name of medication being requested: prazosin (MINIPRESS) 1 MG capsule   traZODone (DESYREL) 100 MG tablet   Provider who prescribed the medication: Delicia Diop NP   Pharmacy:   [facility] - [place] [address]      Date medication is needed: 4/18    Patient out of medication    Action Taken: Other: [facility]    Travel Screening: Not Applicable

## 2024-04-18 NOTE — TELEPHONE ENCOUNTER
Incoming call from Patient's sister looking to schedule and receive refill. Writer obtained information that patient last took medication around the 10th-12th of February.

## 2024-04-19 NOTE — TELEPHONE ENCOUNTER
"Refill request received from: guardian via phone    Last appointment: 9/14/2023    RTC: 1 month    Canceled appointments: 0    No Showed appointments: 10/18/2024    Follow up scheduled: 4/29/2024    Requested medication(s) (copy and paste last order information):     Disp Refills Start End KANWAL    traZODone (DESYREL) 100 MG tablet 45 tablet 2 1/8/2024 -- No   Sig - Route: Take 1.5 tablets (150 mg) by mouth at bedtime - Oral   Sent to pharmacy as: traZODone HCl 100 MG Oral Tablet (DESYREL)   Class: E-Prescribe   Order: [id number]   E-Prescribing Status: Receipt confirmed by pharmacy (1/8/2024  3:30 PM CST)       Disp Refills Start End KANWAL    prazosin (MINIPRESS) 1 MG capsule 90 capsule 2 1/8/2024 -- No   Sig - Route: Take 3 capsules (3 mg) by mouth at bedtime - Oral   Sent to pharmacy as: Prazosin HCl 1 MG Oral Capsule (MINIPRESS)   Class: E-Prescribe   Order: [id number]   E-Prescribing Status: Receipt confirmed by pharmacy (1/8/2024  3:30 PM CST)       Date medication last filled per outside med information: 3/12/2024 for 30 d/s    Months of medication pended per [facility] refill protocol: 1    Request was sent to Paulette Diop for approval    If patient is due for follow up \"Appointment required for further refills [phone]\" was placed in the sig of the medication and encounter was routed to scheduling pool to encourage follow up.     Medication pended by: Faith Medina RN    "

## 2024-04-22 RX ORDER — TRAZODONE HYDROCHLORIDE 100 MG/1
150 TABLET ORAL AT BEDTIME
Qty: 45 TABLET | Refills: 0 | Status: SHIPPED | OUTPATIENT
Start: 2024-04-22 | End: 2024-04-29

## 2024-04-22 RX ORDER — ARIPIPRAZOLE 10 MG/1
10 TABLET ORAL DAILY
Qty: 30 TABLET | Refills: 0 | Status: SHIPPED | OUTPATIENT
Start: 2024-04-22 | End: 2024-04-29

## 2024-04-22 RX ORDER — PRAZOSIN HYDROCHLORIDE 1 MG/1
3 CAPSULE ORAL AT BEDTIME
Qty: 90 CAPSULE | Refills: 0 | Status: SHIPPED | OUTPATIENT
Start: 2024-04-22 | End: 2024-04-29

## 2024-04-22 NOTE — TELEPHONE ENCOUNTER
Irvin Cuevas,    I dug back into Carty's chart to see how we have titrated him in the past (which he has tolerated). I want to restart him at 10 mg and increase every 2 weeks until he is back to 30 mg. Historically, insurance has not allowed more than one tab per day of each dose. I am thinking for now we just send in a 10 mg prescription and then I can discuss this further with Pura Marina when I see him on 4/29. Do you think that will work? I will also need you to let Pura Marina know that this is the plan. Let me know what you think,.    Paulette

## 2024-04-22 NOTE — TELEPHONE ENCOUNTER
Call placed to sister (Pura Marina) at preferred number on file, unable to reach. LVM on unidentified voicemail stating that the prescription sent for abilify is a 10 mg tablet, and to only give 1 tablet (10 mg) until the appointment with Paulette next week 4/29/2024.

## 2024-04-22 NOTE — TELEPHONE ENCOUNTER
I think we could do that as well (this would reduce likelihood of them running out of medications) but I am not sure if insurance would let them get them all at once? We would for sure need to let Pura Marina know to start with the 10  mg first if we did that. Any chance you could speak with pharmacy and see what they say?    Paulette

## 2024-04-22 NOTE — TELEPHONE ENCOUNTER
Okay that makes sense. I think I can explain that to Pura Marina. To further complicate things, Carloz is very overdue for lab work/primary care and I need to discuss this with Pura Marina as well.     Thanks for all of your work on this one.    Paulette

## 2024-04-22 NOTE — TELEPHONE ENCOUNTER
Okay. Let's start with just the 10's for now and I will send the 20's and 30's after I explain it all further with Puar Marina next week. Thanks, Faith!  Paulette

## 2024-04-22 NOTE — TELEPHONE ENCOUNTER
Trazodone and prazosin are okay for him to restart. I will sign these and start working on a titration plan for kar.    Thanks,  Paulette

## 2024-06-05 ENCOUNTER — OFFICE VISIT (OUTPATIENT)
Dept: PSYCHIATRY | Facility: CLINIC | Age: 44
End: 2024-06-05
Payer: MEDICAID

## 2024-06-05 VITALS
SYSTOLIC BLOOD PRESSURE: 155 MMHG | DIASTOLIC BLOOD PRESSURE: 79 MMHG | HEIGHT: 66 IN | HEART RATE: 61 BPM | WEIGHT: 144 LBS | BODY MASS INDEX: 23.14 KG/M2

## 2024-06-05 DIAGNOSIS — F84.0 AUTISM: ICD-10-CM

## 2024-06-05 DIAGNOSIS — F43.10 PTSD (POST-TRAUMATIC STRESS DISORDER): Primary | ICD-10-CM

## 2024-06-05 DIAGNOSIS — Z51.81 ENCOUNTER FOR THERAPEUTIC DRUG MONITORING: ICD-10-CM

## 2024-06-05 DIAGNOSIS — F84.0 AUTISM SPECTRUM DISORDER: ICD-10-CM

## 2024-06-05 PROCEDURE — G2211 COMPLEX E/M VISIT ADD ON: HCPCS | Performed by: NURSE PRACTITIONER

## 2024-06-05 PROCEDURE — 99214 OFFICE O/P EST MOD 30 MIN: CPT | Performed by: NURSE PRACTITIONER

## 2024-06-05 RX ORDER — ARIPIPRAZOLE 30 MG/1
30 TABLET ORAL DAILY
Qty: 30 TABLET | Refills: 2 | Status: SHIPPED | OUTPATIENT
Start: 2024-06-05 | End: 2024-09-10

## 2024-06-05 RX ORDER — PRAZOSIN HYDROCHLORIDE 2 MG/1
4 CAPSULE ORAL AT BEDTIME
Qty: 60 CAPSULE | Refills: 2 | Status: SHIPPED | OUTPATIENT
Start: 2024-06-05 | End: 2024-09-10

## 2024-06-05 RX ORDER — TRAZODONE HYDROCHLORIDE 100 MG/1
150 TABLET ORAL AT BEDTIME
Qty: 45 TABLET | Refills: 2 | Status: SHIPPED | OUTPATIENT
Start: 2024-06-05 | End: 2024-09-13

## 2024-06-05 NOTE — NURSING NOTE
"Chief Complaint   Patient presents with    RECHECK       BP (!) 155/79 (BP Location: Right arm, Patient Position: Sitting, Cuff Size: Adult Regular)   Pulse 61   Ht 1.685 m (5' 6.34\")   Wt 65.3 kg (144 lb)   BMI 23.01 kg/m      Jessa Pickering, EMT  June 5, 2024    "

## 2024-06-05 NOTE — PATIENT INSTRUCTIONS
**For crisis resources, please see the information at the end of this document**   Patient Education    Thank you for coming to the [facility].    Lab Testing:  If you had lab testing today and your results are reassuring or normal they will be mailed to you or sent through [employer] within 7 days. If the lab tests need quick action we will call you with the results. The phone number we will call with results is # [phone] (home) [phone] (work). If this is not the best number please call our clinic and change the number.    Medication Refills:  If you need any refills please call your pharmacy and they will contact us. Our fax number for refills is [phone]. Please allow three business for refill processing. If you need to  your refill at a new pharmacy, please contact the new pharmacy directly. The new pharmacy will help you get your medications transferred.     Scheduling:  If you have any concerns about today's visit or wish to schedule another appointment please call our office during normal business hours [phone] (8-5:00 M-F)    Contact Us:  Please call [phone] during business hours (8-5:00 M-F).  If after clinic hours, or on the weekend, please call  [phone].    Financial Assistance [phone]  [facility] B[facility] [phone]  Central Billing Office, [facility]: [phone]  [facility] Billing [phone]  Medical Records [phone]  [facility] Patient Bill of Rights [email or web address]        MENTAL HEALTH CRISIS RESOURCES:  For a emergency help, please call 911 or go to the nearest Emergency Department.      Children's Emergency Walk-In Options:   [facility]:  [address] [place] [address], [address]  [facility]:   [address], [address]  [place] - [address], [place], [address]    Adult Emergency Walk-In Options:  [facility]:  [address] [facility], [place], [place], [address]  [facility]:  [address] [place] [address]  [facility]:  [address], [place] [address]  [facility] :  [address] [place] [address]    [facility] Crisis Information:   [place] PRICE) - Adult: [phone]       Child: [phone]  [place] - Adult: [phone]     Child: [phone]  [place]: [phone]  [place]: [phone]  [place]: [phone]    List of all [facility] resources:   https://[place].gov/dhs/people-we-serve/adults/health-care/mental-health/resources/crisis-contacts.jsp     National Crisis Information:   Call or text: '988'  National Suicide Prevention Lifeline: 6-786-424-TALK (1-207.832.8542) - for online chat options, visit https://suicidepreventionlifeline.org/chat/  Poison Control Center: 1-662-300-6039  Trans Lifeline: 1-436.219.2167 - Hotline for transgender people of all ages  The [employer] Project: 1-616-535-3739 - Hotline for LGBT youth      For Non-Emergency Support:   Fast Tracker: Mental Health & Substance Use Disorder Resources -   https://www.fasttrackermn.org/        Again thank you for choosing [facility] and please let us know how we can best partner with you to improve you and your family's health.    You may be receiving a survey regarding this appointment. We would love to have your feedback, both positive and negative. The survey is done by an external company, so your answers are anonymous.

## 2024-06-05 NOTE — PROGRESS NOTES
"PSYCHIATRY CLINIC PROGRESS NOTE    30 minute medication management   IDENTIFICATION: Carloz Drummond is a 44 year old male with previous psychiatric diagnoses of autism and PTSD. Pt's sister (pt's guardian) present for ongoing psychiatric follow-up and pt was seen for last transfer of care evaluation on 3/18/2020.   SUBJECTIVE / INTERIM HISTORY     The pt was last seen in clinic 9/14/2023 at which time no changes were made. The patient reports good medication adherence. Since the last visit, he has taken his medication daily as prescribed. He is up to 20 mg daily of aripiprazole. Blood pressure continues to be elevated.     SYMPTOMS include ongoing lowered frustration tolerance per sister. Carloz states that his mood is \"good\". He continues to wake up often at night. He will move around the house and sister will not know he is awake unless he vocalizes. This causes everyone's sleep to be disrupted so they can ensure he is safe. He is reactive when papers or cards need to be taken away. He continues to masturbate excessively if not being supervised or given another task to do. When he is really frustrated, he will hit himself. He is not aggressive toward others. No other safety concerns reported.    Current Substance Use- denies. Sober support- na     MEDICAL ROS          Reports A comprehensive review of systems was performed and is negative other than noted above.    PAST MEDICATION TRIALS    Naltrexone 50 mg daily previously prescribed for self-injury, unsure of effectiveness.    Hydroxyzine 50 mg at bedtime, decreased response over time.    Risperdal M-Tabs 1 mg PRN, noted to be ineffective for compulsive behavior (collecting/tearing up paper).   MEDICAL HISTORY      [facility] [address] [place] [address]     Neurologic Hx:  head injury- none     seizure- none      LOC- none    other- na   Patient Active Problem List   Diagnosis    Autism     ALLERGY  " "   No Known Allergies    MEDICATIONS      Current Outpatient Medications   Medication Sig Dispense Refill    ARIPiprazole (ABILIFY) 30 MG tablet Take 1 tablet (30 mg) by mouth daily Do not start until after 2 weeks on 20 mg of abilify (aripiprazole) 30 tablet 2    prazosin (MINIPRESS) 2 MG capsule Take 2 capsules (4 mg) by mouth at bedtime 60 capsule 2    traZODone (DESYREL) 100 MG tablet Take 1.5 tablets (150 mg) by mouth at bedtime 45 tablet 2     No current facility-administered medications for this visit.       Drug Interaction Check is remarkable for:  none  VITALS    BP (!) 155/79 (BP Location: Right arm, Patient Position: Sitting, Cuff Size: Adult Regular)   Pulse 61   Ht 1.685 m (5' 6.34\")   Wt 65.3 kg (144 lb)   BMI 23.01 kg/m    LABS  use [facility]______       No results found for any previous visit.       MENTAL STATUS EXAM     Alertness: alert  and oriented  Appearance: casually groomed  Behavior/Demeanor: fidgety/restless  Speech: 1-2 word phrases, short,some echolalia noted  Language: intact and limited vocabulary  Psychomotor: unable to assess by telephone  Mood:  \"good\" per Carloz  Affect: blunted, congruent to mood; congruent to   Thought Process/Associations: perseverative by parent report  Thought Content: no observable suicidal or homicidal ideation  Perception: no  Observable hallucinations  Insight: poor  Judgment: limited  Cognition: does not appear grossly intact; formal cognitive testing was not done    PSYCHOLOGICAL TESTING:     none    ASSESSMENT     Carloz Drummond is a 44 year old male with psychiatric diagnoses of autism and PTSD. Carloz was present and cooperative. He answered some questions well. If not engaged, he seemed to drift off or fall asleep on a couple of occasions. He has not been seen by primary care in a while. Sister has struggled to get him in with his previous provider due to issues with paperwork and transportation. Will place a referral today with too. Will " also place a referral for care coordination as  is struggling with finding other options for day programming/work for Carloz. Will increase abilify back to 30 mg today and increase prazosin to 4 mg. Reiterated importance of Carloz being seen by PCP given continued high blood pressure. Also provided information on [facility]. Will follow-up in 1-2 months. Sister to reach out sooner with any questions, concerns, or if an earlier appointment is needed.       The longitudinal plan of care for autism and PTSD were addressed during this visit. Due to the added complexity in care, I will continue to support Carloz in the subsequent management of this condition(s) and with the ongoing continuity of care of this condition(s).      6}       TREATMENT RISK STATEMENT:  The risks, benefits, alternatives and potential adverse effects have been explained and are understood by the pt and pt's parent(s)/guardian.  Discussion of specific concerns included- N/A. The  pt and pt's parent(s)/guardian agrees to the treatment plan with the ability to do so. The  pt and pt's parent(s)/guardian knows to call the clinic for any problems or access emergency care if needed. There are medical considerations relevant to treatment, as noted above. Substance use is not a problem as noted above.      Drug interaction check was done for any med changes and is discussed above.      DIAGNOSES                                                                                                      Encounter Diagnoses   Name Primary?    Autism spectrum disorder     PTSD (post-traumatic stress disorder) Yes    Autism     Encounter for therapeutic drug monitoring                                    PLAN                                                                                                 Medication Plan:         -- increase abilify to 30 mg PO Q Day  sent        -- increase prazosin to 4 mg PO Q HS   Sent       -- continue trazodone 150 mg  PO Q HS   sent    Labs:  none    Pt monitor [call for probs]: nothing specific needed    THERAPY: No Change    REFERRALS [CD, medical, other]:  none    :  none    Controlled Substance Contract was not completed    RTC: 1-2 months    CRISIS NUMBERS: Provided in AVS upon request of patient/guardian.

## 2024-06-07 ENCOUNTER — PATIENT OUTREACH (OUTPATIENT)
Dept: CARE COORDINATION | Facility: CLINIC | Age: 44
End: 2024-06-07
Payer: MEDICAID

## 2024-06-07 NOTE — PROGRESS NOTES
Clinic Care Coordination Chart Review    Situation: Patient chart reviewed by [facility] RAYSA FERNANDEZ.    Background:   Referral placed on:6/5/24  Referral from [facility] Provider: Delicia Diop NP  Chart review completed on:6/7/24    Assessment from chart review:   Name/ age/ gender:   Elieser 44 year old men  [facility] relationship:   Established Psychiatric patient-- medication management  Primary Diagnoses:   Autism   Post-Traumatic Stress Disorder  Additional concerns:   Needs yearly preventative-- challenges with transportation   Sister is guardian  Reason for referral:   Assistance in finding day treatment options  City/county:   [facility]   Family composition:   Per chart review, appears to live with sister, Pura Marina, who is also his guardian  School:   N/A  Primary care provider:   [facility]  Services:   DD Waiver- most recent encounter mentions challenges with finding adequate DT&H  Insurance:   [place]  Additional information:    Plan/Recommendations: [facility] RAYSA CC to outreach to family.    VANESSA Rubin  They/he  , Care Coordination  [facility]  [phone]

## 2024-06-07 NOTE — PROGRESS NOTES
Clinic Care Coordination Contact  [facility]/Voicemail    Clinical Data: Care Coordinator Outreach    Outreach Documentation Number of Outreach Attempt   6/7/2024   9:23 AM 1       Left message on  Carloz's guardian's  voicemail with call back information and requested return call.    Plan: Care Coordinator will send care coordination introduction letter with care coordinator contact information and explanation of care coordination services via mail. Care Coordinator will try to reach patient again in 1-2 business days.    VANESSA Rubin  Social Work Care Coordinator  [facility]  [phone]  [email or web address]  Pronouns: They/He

## 2024-06-11 NOTE — PROGRESS NOTES
Clinic Care Coordination Contact  [facility]/Voicemail    Clinical Data: Care Coordinator Outreach    Outreach Documentation Number of Outreach Attempt   6/7/2024   9:23 AM 1   6/11/2024   9:42 AM 2       Left message on  Carloz's guardian  voicemail with call back information and requested return call.    Plan: Care Coordinator will send care coordination introduction letter with care coordinator contact information and explanation of care coordination services via mail. Care Coordinator will try to reach patient again in 10 business days.    VANESSA Rubin  Social Work Care Coordinator  [facility]  [phone]  [email or web address]  Pronouns: They/He

## 2024-06-28 NOTE — PROGRESS NOTES
Clinic Care Coordination Contact  [facility]/Voicemail    Clinical Data: Care Coordinator Outreach    Outreach Documentation Number of Outreach Attempt   6/7/2024   9:23 AM 1   6/11/2024   9:42 AM 2   6/28/2024  11:19 AM 3       Left message on  patient's guardian's  voicemail with call back information and requested return call.    Plan: Care Coordinator will do no further outreaches at this time.    VANESSA Rubin  Social Work Care Coordinator  [facility]  [phone]  [email or web address]  Pronouns: They/He

## 2024-07-16 ENCOUNTER — TELEPHONE (OUTPATIENT)
Dept: PSYCHIATRY | Facility: CLINIC | Age: 44
End: 2024-07-16
Payer: MEDICAID

## 2024-07-16 NOTE — TELEPHONE ENCOUNTER
[facility] Call Center    Phone Message    May a detailed message be left on voicemail: yes     Reason for Call: Form or Letter   Type or form/letter needing completion: Resources for services  Provider: DILSHAD Lyle CNP  Date form needed: ASAP  Note: Pura Marina stated that the nurse was going to supply resources for services and she has not heard anythig back. Requesting a call to give updates and retrieve resources.    Action Taken: Message routed to:  Other: [facility]    Last Appointment: 06/05/24  Next Appointment: 07/17/24

## 2024-07-17 ENCOUNTER — VIRTUAL VISIT (OUTPATIENT)
Dept: PSYCHIATRY | Facility: CLINIC | Age: 44
End: 2024-07-17
Payer: MEDICAID

## 2024-07-17 DIAGNOSIS — F84.0 AUTISM: ICD-10-CM

## 2024-07-17 DIAGNOSIS — F84.0 AUTISM SPECTRUM DISORDER: Primary | ICD-10-CM

## 2024-07-17 DIAGNOSIS — F43.10 PTSD (POST-TRAUMATIC STRESS DISORDER): ICD-10-CM

## 2024-07-17 PROCEDURE — G2211 COMPLEX E/M VISIT ADD ON: HCPCS | Mod: 95 | Performed by: NURSE PRACTITIONER

## 2024-07-17 PROCEDURE — 99214 OFFICE O/P EST MOD 30 MIN: CPT | Mod: 95 | Performed by: NURSE PRACTITIONER

## 2024-07-17 ASSESSMENT — PAIN SCALES - GENERAL: PAINLEVEL: NO PAIN (0)

## 2024-07-17 NOTE — PROGRESS NOTES
"Virtual Visit Details    Type of service:  Video Visit     Originating Location (pt. Location): Home    Distant Location (provider location):  Off-site  Platform used for Video Visit: [facility]  PSYCHIATRY CLINIC PROGRESS NOTE    30 minute medication management   IDENTIFICATION: Carloz Drummond is a 44 year old male with previous psychiatric diagnoses of autism and PTSD. Pt's sister (pt's guardian) present for ongoing psychiatric follow-up and pt was seen for last transfer of care evaluation on 3/18/2020.   SUBJECTIVE / INTERIM HISTORY     The pt was last seen in clinic 6/5/2024 at which time abilify was increased back to 30 mg and prazosin was increased to 4 mg nighly. The patient reports good medication adherence. Since the last visit, he has taken his medication most days as prescribed overseen by his sister and niece who are is PCA's. No side effects have been reported or observed. Pura Marina reports that his work has been reaching out to her niece and is trying to get her to file for co-guardianship. Sister currently has a pinched nerve in her neck so she has been struggling a bit to keep up with paperwork for Carloz.     SYMPTOMS include some improvement in sleep. He seems to be sleeping better and more consistently through the night. Sister notes that he has been dealing with constipation and increased urinary frequency. She thinks he seems to be holding in his feces. He has been repeating threatening language that she thinks he hears at work. Example today was that while in the shower she overheard him saying to himself \"I am going to kill you if you steal anymore paper out of my office\". He has not been aggressive with family or coworkers but he does continue to need frequent redirection or structure in his day to keep him from excessively masturbating. No safety concerns reported today.     Current Substance Use- none. Sober support- na     MEDICAL ROS          Reports A comprehensive review of systems was " "performed and is negative other than noted above.     PAST MEDICATION TRIALS    Naltrexone 50 mg daily previously prescribed for self-injury, unsure of effectiveness.    Hydroxyzine 50 mg at bedtime, decreased response over time.    Risperdal M-Tabs 1 mg PRN, noted to be ineffective for compulsive behavior (collecting/tearing up paper).   MEDICAL HISTORY      Primary Care Physician: Clinic, [facility] at [address] [place] [address]     Neurologic Hx:  head injury- none     seizure- none      LOC- none    other- na   Patient Active Problem List   Diagnosis    Autism     ALLERGY     No Known Allergies    MEDICATIONS      Current Outpatient Medications   Medication Sig Dispense Refill    ARIPiprazole (ABILIFY) 30 MG tablet Take 1 tablet (30 mg) by mouth daily Do not start until after 2 weeks on 20 mg of abilify (aripiprazole) 30 tablet 2    prazosin (MINIPRESS) 2 MG capsule Take 2 capsules (4 mg) by mouth at bedtime 60 capsule 2    traZODone (DESYREL) 100 MG tablet Take 1.5 tablets (150 mg) by mouth at bedtime 45 tablet 2     No current facility-administered medications for this visit.       Drug Interaction Check is remarkable for:  None  VITALS    There were no vitals taken for this visit.  LABS  use PSYCHLAB______       No results found for any previous visit.       MENTAL STATUS EXAM     Alertness: alert  and oriented  Appearance: casually groomed  Behavior/Demeanor: fidgety/restless  Speech: 1-2 word phrases, short,some echolalia noted  Language: intact and limited vocabulary  Psychomotor: unable to assess by telephone  Mood:  \"good\" per Carty  Affect: blunted, congruent to mood; congruent to   Thought Process/Associations: perseverative by parent report  Thought Content: no observable suicidal or homicidal ideation  Perception: no observable hallucinations  Insight: poor  Judgment: limited  Cognition: does not appear grossly intact; formal cognitive testing was not done    PSYCHOLOGICAL " TESTING:     none    ASSESSMENT     Carloz Drummond is a 44 year old male with psychiatric diagnoses of  autism and PTSD. Carloz was present and cooperative. He answered some questions well but was only engaged at the end of the appointment, though was present the entire appointment. He is sleeping better with the increased prazosin. Pura Marina would like to keep medication the same today. Follow-up planned for 2 months.       The longitudinal plan of care for autism and PTSD  were addressed during this visit. Due to the added complexity in care, I will continue to support Carloz in the subsequent management of this condition(s) and with the ongoing continuity of care of this condition(s).      6}       TREATMENT RISK STATEMENT:  The risks, benefits, alternatives and potential adverse effects have been explained and are understood by the pt and pt's parent(s)/guardian.  Discussion of specific concerns included- N/A. The  pt and pt's parent(s)/guardian agrees to the treatment plan with the ability to do so. The  pt and pt's parent(s)/guardian knows to call the clinic for any problems or access emergency care if needed. There are no medical considerations relevant to treatment, as noted above. Substance use is not a problem as noted above.      Drug interaction check was done for any med changes and is discussed above.      DIAGNOSES                                                                                                      Encounter Diagnoses   Name Primary?    Autism spectrum disorder Yes    PTSD (post-traumatic stress disorder)                                    PLAN                                                                                                 Medication Plan:         -- continue abilify to 30 mg PO Q Day  Should have refills on file       -- continue prazosin to 4 mg PO Q HS                 Should have refills on file       -- continue trazodone 150 mg PO Q HS                 Should have  refills on file    Labs:  none    Pt monitor [call for probs]: nothing specific needed    THERAPY: No Change    REFERRALS [CD, medical, other]:  none    :  none    Controlled Substance Contract was not completed    RTC: 3 months    CRISIS NUMBERS: Provided in AVS upon request of patient/guardian.

## 2024-07-17 NOTE — NURSING NOTE
Current patient location: [address] [place] [address]    Is the patient currently in the state of [place]? YES    Visit mode:TELEPHONE    If the visit is dropped, the patient can be reconnected by: VIDEO VISIT: Text to cell phone:   Telephone Information:   Mobile [phone]       Will anyone else be joining the visit? Pura Marina (GUARDIAN )  (If patient encounters technical issues they should call [phone])    How would you like to obtain your AVS? MyChart    Are changes needed to the allergy or medication list? No    Are refills needed on medications prescribed by this physician? NO    Reason for visit: RECHECK    Bhavya BULLARDF

## 2024-09-10 ENCOUNTER — VIRTUAL VISIT (OUTPATIENT)
Dept: PSYCHIATRY | Facility: CLINIC | Age: 44
End: 2024-09-10
Payer: MEDICAID

## 2024-09-10 DIAGNOSIS — F43.10 PTSD (POST-TRAUMATIC STRESS DISORDER): ICD-10-CM

## 2024-09-10 DIAGNOSIS — F84.0 AUTISM SPECTRUM DISORDER: Primary | ICD-10-CM

## 2024-09-10 PROCEDURE — 99214 OFFICE O/P EST MOD 30 MIN: CPT | Mod: 95 | Performed by: NURSE PRACTITIONER

## 2024-09-10 PROCEDURE — G2211 COMPLEX E/M VISIT ADD ON: HCPCS | Mod: 95 | Performed by: NURSE PRACTITIONER

## 2024-09-10 NOTE — NURSING NOTE
Current patient location: [address] [place] [address]    Is the patient currently in the state of [place]? YES    Visit mode:VIDEO    If the visit is dropped, the patient can be reconnected by: VIDEO VISIT: Send to e-mail at: [email or web address]    Will anyone else be joining the visit? NO  (If patient encounters technical issues they should call [phone])    How would you like to obtain your AVS? MyChart    Are changes needed to the allergy or medication list? N/A    Are refills needed on medications prescribed by this physician? NO    Rooming Documentation:  Unable to complete questionnaire(s) due to time      Reason for visit: RECHECK    Flora Rondon VVF    No rooming due to time.

## 2024-09-10 NOTE — PROGRESS NOTES
Virtual Visit Details    Type of service:  Video Visit   Video Start Time:  1:18  Video End Time: 1:40    Originating Location (pt. Location): Home    Distant Location (provider location):  On-site  Platform used for Video Visit: Binh

## 2024-09-12 DIAGNOSIS — Z51.81 ENCOUNTER FOR THERAPEUTIC DRUG MONITORING: Primary | ICD-10-CM

## 2024-09-12 DIAGNOSIS — F84.0 AUTISM: ICD-10-CM

## 2024-09-12 NOTE — TELEPHONE ENCOUNTER
Guardian is calling asking for a copy of pateint Dx and demographic information so she can get patient a State ID.   Please email this to Guardian. She have verbal consent to said via email because her phone is currently inactive.   [email or web address]

## 2024-09-13 ENCOUNTER — TELEPHONE (OUTPATIENT)
Dept: PSYCHIATRY | Facility: CLINIC | Age: 44
End: 2024-09-13
Payer: MEDICAID

## 2024-09-13 NOTE — TELEPHONE ENCOUNTER
Suhas Inman hours ago (11:38 AM)     DANA Neves is calling asking for provider to Fax over EKG order to [phone].   Please advise  Thank you          Note     Pura Drummond (GUARDIAN) [phone]  Suhas Inman hours ago (11:36 AM)         EKG order added to encounter for provider signature. Once complete writer will fax to above number provided.

## 2024-09-13 NOTE — TELEPHONE ENCOUNTER
"Return call placed to Puratrudy Marina to go over some follow up from the appointment:  - Needs to go to PCP for EKG in the next month (preferably before they meet with Paulette again) because this could be very serious.   - Any change in cognition or odd behaviors/seeming more tired, etc, he needs to go to the ED.   - No prazosin or Abilify until he is seen for EKG  - Trazodone can be refilled at 50 mg - Pura Marina confirmed the [facility] pharmacy on [address] in [place]  - Writer inquired about who the person is that takes care of Carloz's blood pressure checks? Her name is Beverly and she is through [employer]. Got set up through work maybe, Pura Marina doesn't remember exactly. Through that program they also used to set up an appointment on TV to see a doctor, but that stopped.    - Pura Marina voiced some concerns about him going to work so he has stopped. Gave an example that he fell at work and came home bruised. Boss was \"overstepping boundaries\" reaching out to Pura Marina's niece trying her to take guardianship.  - Pura Marina is working with  to try to find a new day program.     - Writer reviewed the [facility] FACE SHEET with Pura Marina and confirmed that it has all of the information she needs. Copy of form sent to [email or web address] per her request. She also is requesting that a paper copy be sent to her in the mail. This form includes patient's previous primary care information for Pura Marina to connect with.    Writer reiterated plan to have patient complete an EKG with primary care, Pura Marina agreed stating that she is also really concerned about patient so she will get it done as soon as possible.    Request for Trazodone 50 mg to go to [facility] pharmacy on [address] in [place] sent to provider for authorization.    "

## 2024-09-13 NOTE — TELEPHONE ENCOUNTER
Pura is calling asking for provider to Fax over EKG order to [phone].   Please advise  Thank you

## 2024-09-16 RX ORDER — TRAZODONE HYDROCHLORIDE 50 MG/1
50 TABLET, FILM COATED ORAL AT BEDTIME
Qty: 30 TABLET | Refills: 0 | Status: SHIPPED | OUTPATIENT
Start: 2024-09-16 | End: 2024-10-01

## 2024-09-17 NOTE — TELEPHONE ENCOUNTER
EKG order faxed to [phone] per Epic routing function.     Return call placed to Pura Marina, unable to reach, [facility] stating that the EKG order was faxed and to reach out if there is anything else they need.

## 2024-10-01 ENCOUNTER — OFFICE VISIT (OUTPATIENT)
Dept: PSYCHIATRY | Facility: CLINIC | Age: 44
End: 2024-10-01
Payer: MEDICAID

## 2024-10-01 VITALS
WEIGHT: 142.6 LBS | BODY MASS INDEX: 22.92 KG/M2 | HEIGHT: 66 IN | SYSTOLIC BLOOD PRESSURE: 139 MMHG | HEART RATE: 87 BPM | DIASTOLIC BLOOD PRESSURE: 89 MMHG

## 2024-10-01 DIAGNOSIS — F43.10 PTSD (POST-TRAUMATIC STRESS DISORDER): ICD-10-CM

## 2024-10-01 DIAGNOSIS — F84.0 AUTISM: Primary | ICD-10-CM

## 2024-10-01 PROCEDURE — G2211 COMPLEX E/M VISIT ADD ON: HCPCS | Performed by: NURSE PRACTITIONER

## 2024-10-01 PROCEDURE — 99214 OFFICE O/P EST MOD 30 MIN: CPT | Performed by: NURSE PRACTITIONER

## 2024-10-01 RX ORDER — TRAZODONE HYDROCHLORIDE 50 MG/1
50 TABLET, FILM COATED ORAL AT BEDTIME
Qty: 30 TABLET | Refills: 0 | Status: SHIPPED | OUTPATIENT
Start: 2024-10-01

## 2024-10-01 NOTE — PATIENT INSTRUCTIONS
**For crisis resources, please see the information at the end of this document**   Patient Education    Thank you for coming to the [facility].    Lab Testing:  If you had lab testing today and your results are reassuring or normal they will be mailed to you or sent through [employer] within 7 days. If the lab tests need quick action we will call you with the results. The phone number we will call with results is # [phone] (home) . If this is not the best number please call our clinic and change the number.    Medication Refills:  If you need any refills please call your pharmacy and they will contact us. Our fax number for refills is [phone]. Please allow three business for refill processing. If you need to  your refill at a new pharmacy, please contact the new pharmacy directly. The new pharmacy will help you get your medications transferred.     Scheduling:  If you have any concerns about today's visit or wish to schedule another appointment please call our office during normal business hours [phone] (8-5:00 M-F)    Contact Us:  Please call [phone] during business hours (8-5:00 M-F).  If after clinic hours, or on the weekend, please call  [phone].    Financial Assistance [phone]  [facility] Billing [phone]  Central Billing Office, [facility]: [phone]  [facility] Billing [phone]  Medical Records [phone]  [facility] Patient Bill of Rights [email or web address]        MENTAL HEALTH CRISIS RESOURCES:  For a emergency help, please call 911 or go to the nearest Emergency Department.      Children's Emergency Walk-In Options:   [facility]:  [address], [place], [address]  [facility]:   [address], [address]  [place] - [address], [place], [place],  [address]    Adult Emergency Walk-In Options:  [facility]:  [facility], [place], [place], [address]  [facility] - [facility]:  [address] [place] [address]  [facility]:  [address], [place] [address]  [facility] :  [address] [place] [address]    [facility] Crisis Information:   Ashvin PRICE) - Adult: [phone]       Child: [phone]  Ronni - Adult: [phone]     Child: [phone]  [place]: [phone]  [place]: [phone]  [place]: [phone]    List of all [facility] resources:   https://[place].gov/dhs/people-we-serve/adults/health-care/mental-health/resources/crisis-contacts.jsp     National Crisis Information:   Call or text: '988'  National Suicide Prevention Lifeline: 7-643-282-TALK (1-439.730.9130) - for online chat options, visit https://suicidepreventionlifeline.org/chat/  Poison Control Center: 5-900-471-9388  Trans Lifeline: 8-660-475-0212 - Hotline for transgender people of all ages  The Genn[employer] Project: 3-244-961-3307 - Hotline for LGBT youth      For Non-Emergency Support:   Fast Tracker: Mental Health & Substance Use Disorder Resources -   https://www.fasttrackermn.org/        Again thank you for choosing [facility] and please let us know how we can best partner with you to improve you and your family's health.    You may be receiving a survey regarding this appointment. We would love to have your feedback, both positive and negative. The survey is done by an external company, so your answers are anonymous.

## 2024-10-01 NOTE — PROGRESS NOTES
PSYCHIATRY CLINIC PROGRESS NOTE    30 minute medication management   IDENTIFICATION: Carloz Drummond is a 44 year old male with previous psychiatric diagnoses of autism and PTSD. Pt's sister (pt's guardian) present for ongoing psychiatric follow-up and pt was seen for last transfer of care evaluation on 3/18/2020.   SUBJECTIVE / INTERIM HISTORY     The pt was last seen in clinic 9/10/2024 at which time abilify and prazosin were not restarted due to cardiac concerns. Trazodone was restarted but cautiously at 50 mg nightly. Guardian informed pt needed to be seen by PCP and have EKG completed before any other medication changes could be made The patient reports poor medication adherence. Since the last visit, they did not receive the trazodone. They (sister and new ) are looking for a new day program for Carloz since he is no longer attending his previous work. He has an appointment tomorrow to re-establish primary care at [facility]. They have the EKG orders.     SYMPTOMS include an increase in energy, reduced sleep. More fidgety since going off medications.  He has not been sleeping well. He seems to be displaying more tics/stimming behaviors per sister and niece as well. There  are concerns for his safety at night, essentially if he is awake, sister needs to stay  up to make sure he stays in the home and is not impulsive/vulnerable. He has not been aggressive or agitated with family.     Current Substance Use- none. Sober support- na     MEDICAL ROS          Reports A comprehensive review of systems was performed and is negative other than noted above.     PAST MEDICATION TRIALS    Naltrexone 50 mg daily previously prescribed for self-injury, unsure of effectiveness.    Hydroxyzine 50 mg at bedtime, decreased response over time.    Risperdal M-Tabs 1 mg PRN, noted to be ineffective for compulsive behavior (collecting/tearing up paper).   MEDICAL HISTORY      Primary Care Physician: [facility] [address] [place] [address]     Neurologic Hx:  head injury- none     seizure- none      LOC- none    other- na   Patient Active Problem List   Diagnosis    Autism     ALLERGY     No Known Allergies    MEDICATIONS      Current Outpatient Medications   Medication Sig Dispense Refill    traZODone (DESYREL) 50 MG tablet Take 1 tablet (50 mg) by mouth at bedtime. 30 tablet 0     No current facility-administered medications for this visit.       Drug Interaction Check is remarkable for:  None  VITALS    /89 (BP Location: Right arm, Patient Position: Sitting, Cuff Size: Adult Regular)   Pulse 87   Ht 1.676 m (5' 6\")   Wt 64.7 kg (142 lb 9.6 oz)   BMI 23.02 kg/m    LABS  use Declara______       No results found for any previous visit.       MENTAL STATUS EXAM     Alertness: alert  and oriented  Appearance: casually groomed  Behavior/Demeanor: cooperative, with poor eye contact  Speech: 1-2 word phrases, short,some echolalia noted   Language: intact and limited vocabulary  Psychomotor: restless and fidgety  Mood:   more energized  Affect: blunted; was congruent to mood; was congruent to content  Thought Process/Associations: unremarkable  Thought Content: no observable suicidal or homicidal ideation   Perception: no observable hallucinations   Insight: limited  Judgment: limited  Cognition: does not appear grossly intact; formal cognitive testing was not done     PSYCHOLOGICAL TESTING:     none    ASSESSMENT     Carloz Drummond is a 44 year old male with psychiatric diagnoses of autism and PTSD. Carloz was present and cooperative. He answered some questions well but sister (guardian) and niece provided the majority of the feedback today. He has been sleeping poorly and overall seems more activated and restless without his medication. They were never informed by pharmacy that trazodone was ready so he has not restarted this yet. Due to safety concerns at night when he is not " sleeping, will resend to pharmacy today. No other medications ordered until he is seen by PCP and gets an EKG as well.         The longitudinal plan of care for autism and PTSD  were addressed during this visit. Due to the added complexity in care, I will continue to support Carty in the subsequent management of this condition(s) and with the ongoing continuity of care of this condition(s).      6}       TREATMENT RISK STATEMENT:  The risks, benefits, alternatives and potential adverse effects have been explained and are understood by the pt and pt's parent(s)/guardian.  Discussion of specific concerns included- N/A. The  pt and pt's parent(s)/guardian agrees to the treatment plan with the ability to do so. The  pt and pt's parent(s)/guardian knows to call the clinic for any problems or access emergency care if needed. There are no medical considerations relevant to treatment, as noted above. Substance use is not a problem as noted above.      Drug interaction check was done for any med changes and is discussed above.      DIAGNOSES                                                                                                      Encounter Diagnoses   Name Primary?    Autism Yes    PTSD (post-traumatic stress disorder)                                    PLAN                                                                                                 Medication Plan:         -- continue trazodone 50 mg PO Q Day  sent     Labs:  pt overdue for blood pressure and EKG    Pt monitor [call for probs]: nothing specific needed    THERAPY: No Change    REFERRALS [CD, medical, other]:  none    :  none    Controlled Substance Contract was not completed    RTC: 1 month    CRISIS NUMBERS: Provided in AVS upon request of patient/guardian.

## 2024-10-01 NOTE — NURSING NOTE
"Chief Complaint   Patient presents with    Eval/Assessment       /89 (BP Location: Right arm, Patient Position: Sitting, Cuff Size: Adult Regular)   Pulse 87   Ht 1.676 m (5' 6\")   Wt 64.7 kg (142 lb 9.6 oz)   BMI 23.02 kg/m      Cuauhtemoc Neely  October 1, 2024   "

## 2024-10-11 DIAGNOSIS — F43.10 PTSD (POST-TRAUMATIC STRESS DISORDER): ICD-10-CM

## 2024-10-11 DIAGNOSIS — F84.0 AUTISM SPECTRUM DISORDER: ICD-10-CM

## 2024-10-14 RX ORDER — ARIPIPRAZOLE 10 MG/1
TABLET ORAL
Qty: 14 TABLET | Refills: 0 | Status: SHIPPED | OUTPATIENT
Start: 2024-10-14 | End: 2024-10-28

## 2024-10-14 RX ORDER — ARIPIPRAZOLE 20 MG/1
20 TABLET ORAL DAILY
Qty: 14 TABLET | Refills: 0 | Status: SHIPPED | OUTPATIENT
Start: 2024-10-29 | End: 2024-11-12

## 2024-10-14 RX ORDER — ARIPIPRAZOLE 30 MG/1
30 TABLET ORAL DAILY
Qty: 30 TABLET | Refills: 0 | Status: SHIPPED | OUTPATIENT
Start: 2024-11-13

## 2024-10-14 RX ORDER — PRAZOSIN HYDROCHLORIDE 1 MG/1
CAPSULE ORAL
Qty: 132 CAPSULE | Refills: 0 | Status: SHIPPED | OUTPATIENT
Start: 2024-10-14 | End: 2024-12-11

## 2024-10-14 RX ORDER — PRAZOSIN HYDROCHLORIDE 2 MG/1
4 CAPSULE ORAL AT BEDTIME
Qty: 60 CAPSULE | Refills: 2 | Status: CANCELLED | OUTPATIENT
Start: 2024-10-14

## 2024-10-14 NOTE — TELEPHONE ENCOUNTER
Call placed to sister at preferred number on file:  - Relayed that we were able to find EKG results and that provider agrees with restarting Abilify and Prazosin.   - Instructions for restarting to be; Abilify 10 mg daily for 2 weeks, increase to 20 mg daily for two weeks, increase and stay at 30 mg daily. Prazosin 1 mg daily for 2 weeks, increase to 2 mg daily for two weeks, increase and stay at 3 mg daily.  - Sister confirmed that she wants medications sent to [facility] on [address].

## 2024-10-14 NOTE — TELEPHONE ENCOUNTER
So glad they got in and are re-established. Please confirm with Pura Marina where she wants prescriptions sent since we will have to ramp back up. Abilify is 10 mg every two weeks until back to 30 and prazosin is 1 mg every two weeks until back to 3 mg.    Thanks,  Paulette

## 2024-10-22 ENCOUNTER — TELEPHONE (OUTPATIENT)
Dept: PSYCHIATRY | Facility: CLINIC | Age: 44
End: 2024-10-22
Payer: MEDICAID

## 2024-10-22 NOTE — TELEPHONE ENCOUNTER
Left voicemail on 10/22 for patient/patient's guardian regarding appointment cancellation due to provider illness. Directed them to call us back to reschedule.

## 2024-10-25 DIAGNOSIS — F84.0 AUTISM: ICD-10-CM

## 2024-10-27 DIAGNOSIS — F84.0 AUTISM SPECTRUM DISORDER: ICD-10-CM

## 2024-10-28 RX ORDER — ARIPIPRAZOLE 10 MG/1
TABLET ORAL
Qty: 14 TABLET | Refills: 0 | OUTPATIENT
Start: 2024-10-28

## 2024-10-28 RX ORDER — TRAZODONE HYDROCHLORIDE 50 MG/1
50 TABLET, FILM COATED ORAL AT BEDTIME
Qty: 30 TABLET | Refills: 0 | Status: SHIPPED | OUTPATIENT
Start: 2024-10-28

## 2024-10-28 NOTE — TELEPHONE ENCOUNTER
"Refill request received from: pharmacy e-prescribe    Last appointment: 10/01/2024    RTC: 1 month    Canceled appointments: 0    No Showed appointments: 0    Follow up scheduled: 10/22/2024    Requested medication(s) (copy and paste last order information):     Disp Refills Start End KANWAL    traZODone (DESYREL) 50 MG tablet 30 tablet 0 10/1/2024 -- No   Sig - Route: Take 1 tablet (50 mg) by mouth at bedtime. - Oral   Sent to pharmacy as: traZODone HCl 50 MG Oral Tablet (DESYREL)   Class: E-Prescribe   Order: [id number]   E-Prescribing Status: Receipt confirmed by pharmacy (10/1/2024  2:22 PM CDT)       Date medication last filled per outside med information: 10/01/2024 for 30 d/s    Months of medication pended per [facility] refill protocol: 1    Request was sent to Paulette Diop for approval    If patient is due for follow up \"Appointment required for further refills [phone]\" was placed in the sig of the medication and encounter was routed to scheduling pool to encourage follow up.     Medication pended by: Faith Medina RN    "

## 2024-10-28 NOTE — TELEPHONE ENCOUNTER
Refill request DENIED. Prescription for the 10 mg tablets was written as part of a ramp up to get to 30 mg dose.

## 2024-11-09 DIAGNOSIS — F84.0 AUTISM SPECTRUM DISORDER: ICD-10-CM

## 2024-11-11 RX ORDER — ARIPIPRAZOLE 20 MG/1
20 TABLET ORAL DAILY
Qty: 14 TABLET | Refills: 0 | OUTPATIENT
Start: 2024-11-11 | End: 2024-11-25

## 2024-11-11 NOTE — TELEPHONE ENCOUNTER
Last seen: 10/1/24  RTC: 1 month  Cancel: 1  No-show: 0  Next appt: 12/10/24    Incoming refill from pharmacy via [employer].     Disp Refills Start End KANWAL   ARIPiprazole (ABILIFY) 20 MG tablet 14 tablet 0 10/29/2024 11/12/2024 No   Sig - Route: Take 1 tablet (20 mg) by mouth daily for 14 days. After 2 weeks on 10 mg of abilify (aripiprazole) - Oral     Last refilled: 10/29 for a 30 day supply.     Refill request DENIED as patient is to begin taking 30 mg daily once 2 weeks on 20 mg is completed.     ERLINDA Bullock RN

## 2024-11-25 NOTE — PROGRESS NOTES
PSYCHIATRY CLINIC PROGRESS NOTE    30 minute medication management   IDENTIFICATION: Carloz Drummond is a 44 year old male with previous psychiatric diagnoses of autism and PTSD. Pt's sister (pt's guardian) present for ongoing psychiatric follow-up and pt was seen for last transfer of care evaluation on 3/18/2020.   SUBJECTIVE / INTERIM HISTORY     The pt was last seen in clinic 7/17/2024 at which time no medication changes were made. The patient reports poor medication adherence. Since the last visit, he lost insurance and has not had his medications for a long time, per Pura Marina. He has a nurse that comes out to check his BP and HR. Pura Marina reports that at the last visit, she noticed that his HR was variable and seemed to be low, per Pura Marina at 44 BPM. The nurse did recommend that they follow-up with PCP but without insurance, they have not been able to do that. Per Pura Marina, Carloz's insurance was just reinstated today.     SYMPTOMS include worsening of sleep without medications. He has only been sleeping about 3 hours each night without his medications. Mood has been overall stable with no safety concerns. He is no longer attending the work program due to often coming home crying and guardian being concerned about how they are treating him there.     Current Substance Use- none. Sober support- na     MEDICAL ROS          Reports A comprehensive review of systems was performed and is negative other than noted above.     PAST MEDICATION TRIALS    Naltrexone 50 mg daily previously prescribed for self-injury, unsure of effectiveness.    Hydroxyzine 50 mg at bedtime, decreased response over time.    Risperdal M-Tabs 1 mg PRN, noted to be ineffective for compulsive behavior (collecting/tearing up paper).   MEDICAL HISTORY      [facility] [address] [place] [id number]     Neurologic Hx:  head injury- none     seizure- none      LOC- none    other-  "na   Patient Active Problem List   Diagnosis    Autism     ALLERGY     No Known Allergies    MEDICATIONS      Current Outpatient Medications   Medication Sig Dispense Refill    traZODone (DESYREL) 100 MG tablet Take 1.5 tablets (150 mg) by mouth at bedtime 45 tablet 2     No current facility-administered medications for this visit.       Drug Interaction Check is remarkable for:  none  VITALS    There were no vitals taken for this visit.  LABS  use PSYCHLAB______       No results found for any previous visit.       MENTAL STATUS EXAM     Alertness: alert  and oriented  Appearance: casually groomed, somewhat disheveled, only a shirt and boxers on today  Behavior/Demeanor: fidgety/restless  Speech: 1-2 word phrases, short,some echolalia noted  Language: intact and limited vocabulary  Psychomotor: unable to assess by telephone  Mood:  \"good\" per Carloz  Affect: blunted, congruent to mood; congruent to   Thought Process/Associations: perseverative by parent report  Thought Content: no observable suicidal or homicidal ideation  Perception: no observable hallucinations  Insight: poor  Judgment: limited  Cognition: does not appear grossly intact; formal cognitive testing was not done     PSYCHOLOGICAL TESTING:     none    ASSESSMENT     Carloz Drummond is a 44 year old male with psychiatric diagnoses of autism and PTSD. Carloz was present and cooperative. He answered some questions well but was only engaged for a short time with the appointment today. He is no longer attending his work program and he has been off his medications due to insurance issues, which Pura Marina reports was reinstated today. Given the explanation that Pura Marina gave of his last in home nursing visit, will not restart prazosin or abilify today due to possible cardiac concerns. Will restart a lower dose trazodone due to safety concerns when Carloz wakes up in the middle of the night and may not have constant supervision. Pura Marina informed before " getting disconnected due to technical issues that she needs to get Carloz in to primary care as soon as possible to follow-up on low heart rate that she states was observed in August and that no other medications could be safely restarted until this appointment happened. Was then disconnected so message sent to nursing team to assist with primary care appointment and reiterate the plan. Was not able to confirm which pharmacy they would like medications sent to so nursing will also follow-up on this.           The longitudinal plan of care for autism and PTSD  were addressed during this visit. Due to the added complexity in care, I will continue to support Carloz in the subsequent management of this condition(s) and with the ongoing continuity of care of this condition(s).      6}       TREATMENT RISK STATEMENT:  The risks, benefits, alternatives and potential adverse effects have been explained and are understood by the pt and pt's parent(s)/guardian.  Discussion of specific concerns included- N/A. The  pt and pt's parent(s)/guardian agrees to the treatment plan with the ability to do so. The  pt and pt's parent(s)/guardian knows to call the clinic for any problems or access emergency care if needed. There are no medical considerations relevant to treatment, as noted above. Substance use is not a problem as noted above.      Drug interaction check was done for any med changes and is discussed above.      DIAGNOSES                                                                                                      Encounter Diagnoses   Name Primary?    Autism spectrum disorder Yes    PTSD (post-traumatic stress disorder)                                      PLAN                                                                                                 Medication Plan:         -- pt ran our of abilify, will not refill at this time due to cardiac concerns        -- pt ran our of abilify, will not refill at this time  due to cardiac concerns        --restart trazodone 50 mg PO Q Day   Not sent, disconnected before pharmacy could be confirmed    Labs:  EKG/PCP visit recommended    Pt monitor [call for probs]: blood pressure  and heart rate    THERAPY: No Change    REFERRALS [CD, medical, other]:  none    :  none    Controlled Substance Contract was not completed    RTC: 1 month after PCP visit    CRISIS NUMBERS: Provided in AVS upon request of patient/guardian.   Private car

## 2024-12-02 DIAGNOSIS — F84.0 AUTISM: ICD-10-CM

## 2024-12-02 RX ORDER — TRAZODONE HYDROCHLORIDE 50 MG/1
50 TABLET, FILM COATED ORAL AT BEDTIME
Qty: 30 TABLET | Refills: 0 | Status: SHIPPED | OUTPATIENT
Start: 2024-12-02

## 2024-12-02 NOTE — TELEPHONE ENCOUNTER
Last seen: 10/01/2024  RTC: 1 month  Cancel: 10/22/2024 provider initiated  No-show: 0  Next appt: 12/10/2024     Incoming refill from pharmacy e-prescribe    Medication requested:   Pending Prescriptions:                       Disp   Refills    traZODone (DESYREL) 50 MG tablet [Pharmac*30 tab*0            Sig: TAKE 1 TABLET BY MOUTH AT BEDTIME.        Last refill:  10/28/2024 for 30 d/s    From chart note:        -- continue trazodone 50 mg PO Q Day  sent      Medication refill approved per refill protocol.

## 2024-12-10 ENCOUNTER — OFFICE VISIT (OUTPATIENT)
Dept: PSYCHIATRY | Facility: CLINIC | Age: 44
End: 2024-12-10
Payer: MEDICAID

## 2024-12-10 VITALS
HEART RATE: 52 BPM | SYSTOLIC BLOOD PRESSURE: 153 MMHG | WEIGHT: 144.2 LBS | BODY MASS INDEX: 23.27 KG/M2 | DIASTOLIC BLOOD PRESSURE: 93 MMHG

## 2024-12-10 DIAGNOSIS — F43.10 PTSD (POST-TRAUMATIC STRESS DISORDER): ICD-10-CM

## 2024-12-10 DIAGNOSIS — F84.0 AUTISM: Primary | ICD-10-CM

## 2024-12-10 RX ORDER — PRAZOSIN HYDROCHLORIDE 1 MG/1
3 CAPSULE ORAL AT BEDTIME
Qty: 90 CAPSULE | Refills: 1 | Status: SHIPPED | OUTPATIENT
Start: 2024-12-10

## 2024-12-10 RX ORDER — TRAZODONE HYDROCHLORIDE 50 MG/1
50 TABLET, FILM COATED ORAL AT BEDTIME
Qty: 30 TABLET | Refills: 1 | Status: SHIPPED | OUTPATIENT
Start: 2024-12-10

## 2024-12-10 NOTE — NURSING NOTE
Chief Complaint   Patient presents with    RECHECK       BP (!) 153/93 (BP Location: Right arm, Patient Position: Sitting, Cuff Size: Adult Regular)   Pulse 52   Wt 65.4 kg (144 lb 3.2 oz)   BMI 23.27 kg/m      Jessa Pickering, EMT  December 10, 2024

## 2024-12-10 NOTE — PATIENT INSTRUCTIONS
**For crisis resources, please see the information at the end of this document**   Patient Education    Thank you for coming to the [facility].    Lab Testing:  If you had lab testing today and your results are reassuring or normal they will be mailed to you or sent through [email or web address] within 7 days. If the lab tests need quick action we will call you with the results. The phone number we will call with results is # [phone] (home) . If this is not the best number please call our clinic and change the number.    Medication Refills:  If you need any refills please call your pharmacy and they will contact us. Our fax number for refills is [phone]. Please allow three business for refill processing. If you need to  your refill at a new pharmacy, please contact the new pharmacy directly. The new pharmacy will help you get your medications transferred.     Scheduling:  If you have any concerns about today's visit or wish to schedule another appointment please call our office during normal business hours [phone] (8-5:00 M-F)    Contact Us:  Please call [phone] during business hours (8-5:00 M-F).  If after clinic hours, or on the weekend, please call  [phone].    Financial Assistance [phone]  [facility] Billing [phone]  Central Billing Office, [facility]: [phone]  [facility] Billing [phone]  Medical Records [phone]  [facility] Patient Bill of Rights [email or web address]        MENTAL HEALTH CRISIS RESOURCES:  For a emergency help, please call 911 or go to the nearest Emergency Department.      Children's Emergency Walk-In Options:   [facility]:  [address] [place], [address]  [facility]:   [address], [address]  [place] - [address], [place],  [address]    Adult Emergency Walk-In Options:  [facility]:  [facility], [place], [place], [address]  [facility]:  [address] [place] [address]  [facility]:  [address], [place] [address]  [facility] :  [address] [place] [address]    [facility] Crisis Information:   Ashvin PRICE) - Adult: [phone]       Child: [phone]  Ronni - Adult: [phone]     Child: [phone]  [place]: [phone]  [place]: [phone]  [place]: [phone]    List of all [facility] resources:   https://[place].gov/dhs/people-we-serve/adults/health-care/mental-health/resources/crisis-contacts.jsp     National Crisis Information:   Call or text: '988'  National Suicide Prevention Lifeline: 2-483-813-TALK (1-327.269.8278) - for online chat options, visit https://suicidepreventionlifeline.org/chat/  Poison Control Center: 6-629-477-9470  Trans Lifeline: 9-382-123-8537 - Hotline for transgender people of all ages  The [employer] Project: 1-705-211-2757 - Hotline for LGBT youth      For Non-Emergency Support:   Fast Tracker: Mental Health & Substance Use Disorder Resources -   https://www.fasttrackermn.org/        Again thank you for choosing [facility] and please let us know how we can best partner with you to improve you and your family's health.    You may be receiving a survey regarding this appointment. We would love to have your feedback, both positive and negative. The survey is done by an external company, so your answers are anonymous.

## 2024-12-10 NOTE — PROGRESS NOTES
PSYCHIATRY CLINIC PROGRESS NOTE    30 minute medication management   IDENTIFICATION: Carloz Drummond is a 44 year old male with previous psychiatric diagnoses of autism and PTSD. Pt's sister (pt's guardian) present for ongoing psychiatric follow-up and pt was seen for last transfer of care evaluation on 3/18/2020.   SUBJECTIVE / INTERIM HISTORY     The pt was last seen in clinic 10/1/24 at which time pt was referred to PCP for EKG and to re-establish care. The patient reports good medication adherence. Since the last visit, per sister, he is taking his medications daily as prescribed. After EKG was normal and PCP had no concerns with pt restarting medications, plan was made to titrate abilify and prazosin to previous doses. His  is looking into day programming for him but per Pura Marina they are telling her there are no options for him. She notes today that the most recent prescription she picked up for Carloz said to take half a tablet of his morning medications. She is unsure why. Attempted to have Carloz's niece send a picture of the prescription bottle today but unable to confirm this.     SYMPTOMS include significant improvements in mood and sleep, per sister. He has been more engaged with family. He is sleeping through the night most nights. He seems in better spirits overall. No safety concerns reported today.    Current Substance Use- none. Sober support- na     MEDICAL ROS          Reports A comprehensive review of systems was performed and is negative other than noted above..     PAST MEDICATION TRIALS    Naltrexone 50 mg daily previously prescribed for self-injury, unsure of effectiveness.    Hydroxyzine 50 mg at bedtime, decreased response over time.    Risperdal M-Tabs 1 mg PRN, noted to be ineffective for compulsive behavior (collecting/tearing up paper).   MEDICAL HISTORY      Primary Care Physician: [facility] at [address] [place] [id number]     Neurologic  "Hx:  head injury- none     seizure- none      LOC- none    other- na   Patient Active Problem List   Diagnosis    Autism     ALLERGY     No Known Allergies    MEDICATIONS      Current Outpatient Medications   Medication Sig Dispense Refill    ARIPiprazole (ABILIFY) 30 MG tablet Take 1 tablet (30 mg) by mouth daily. Do not start until after 2 weeks on 20 mg of abilify (aripiprazole) 30 tablet 0    prazosin (MINIPRESS) 1 MG capsule Take 1 capsule (1 mg) by mouth at bedtime for 14 days, THEN 2 capsules (2 mg) at bedtime for 14 days, THEN 3 capsules (3 mg) at bedtime. 132 capsule 0    traZODone (DESYREL) 50 MG tablet TAKE 1 TABLET BY MOUTH AT BEDTIME. 30 tablet 0    ARIPiprazole (ABILIFY) 10 MG tablet Take 1 tablet by mouth daily for 14 days. 14 tablet 0    ARIPiprazole (ABILIFY) 20 MG tablet Take 1 tablet (20 mg) by mouth daily for 14 days. After 2 weeks on 10 mg of abilify (aripiprazole) 14 tablet 0     No current facility-administered medications for this visit.       Drug Interaction Check is remarkable for:  none  VITALS    BP (!) 153/93 (BP Location: Right arm, Patient Position: Sitting, Cuff Size: Adult Regular)   Pulse 52   Wt 65.4 kg (144 lb 3.2 oz)   BMI 23.27 kg/m    LABS  use [email or web address]______       No results found for any previous visit.       MENTAL STATUS EXAM     Alertness: alert  and oriented  Appearance: casually groomed  Behavior/Demeanor: cooperative, with poor eye contact  Speech: 1-2 word phrases, short,some echolalia noted   Language: intact and limited vocabulary  Psychomotor: restless and fidgety  Mood:   \"better/more engaged\"  Affect: blunted; was congruent to mood; was congruent to content  Thought Process/Associations: unremarkable  Thought Content: no observable suicidal or homicidal ideation   Perception: no observable hallucinations   Insight: limited  Judgment: limited  Cognition: does not appear grossly intact; formal cognitive testing was not done    PSYCHOLOGICAL TESTING: "     none    ASSESSMENT     Cleve Drummond is a 44 year old male with psychiatric diagnoses of autism and PTSD. Cleve was present and cooperative. He answered some questions today and appeared to be listening to conversation most of the appointment. Pura Marina states that Cleve's mood is much improved, as is sleep, since restarting all of his medications. However BP is elevated again today. Pura Marina also notes that there has been some confusion related to his prescriptions, stating that his morning medication (likely abilify) stated to take only a half tablet. She noticed this a couple of days ago when she was filling his medication organizer. Attempted to have a family member in the home  today text a picture of the prescription bottle but was unable to reach her. It is unclear what dose of abilify Cleve has been receiving as Pura Marina could not recall the specifics of the bottle today. Will refill evening medications but will wait to refill abilify until this is confirmed. Message sent to nursing for help following-up tomorrow. Also instructed Pura Marina to check cleve's BP at home tomorrow as he does get pretty excited when he comes into the office. Follow-up planned for 4-6 weeks.         The longitudinal plan of care for autism and PTSD  were addressed during this visit. Due to the added complexity in care, I will continue to support Cleve in the subsequent management of this condition(s) and with the ongoing continuity of care of this condition(s).      6}       TREATMENT RISK STATEMENT:  The risks, benefits, alternatives and potential adverse effects have been explained and are understood by the pt and pt's parent(s)/guardian.  Discussion of specific concerns included- N/A. The  pt and pt's parent(s)/guardian agrees to the treatment plan with the ability to do so. The  pt and pt's parent(s)/guardian knows to call the clinic for any problems or access emergency care if needed. There are no medical  considerations relevant to treatment, as noted above. Substance use is not a problem as noted above.      Drug interaction check was done for any med changes and is discussed above.      DIAGNOSES                                                                                                      Encounter Diagnoses   Name Primary?    Autism Yes    PTSD (post-traumatic stress disorder)                                  PLAN                                                                                                 Medication Plan:         -- will continue abilify but waiting on confirmation from yodit murphy regarding the dose he is currently getting to send in prescription        -- continue prazosin 3 mg  PO Q HS   Sent       -- continue trazodone 50 mg PO Q HS   sent    Labs:  none    Pt monitor [call for probs]: nothing specific needed    THERAPY: No Change    REFERRALS [CD, medical, other]:  none    :  none    Controlled Substance Contract was not completed    RTC: 4-6 weeks    CRISIS NUMBERS: Provided in AVS upon request of patient/guardian.

## 2024-12-11 ENCOUNTER — TELEPHONE (OUTPATIENT)
Dept: PSYCHIATRY | Facility: CLINIC | Age: 44
End: 2024-12-11
Payer: MEDICAID

## 2024-12-11 VITALS — SYSTOLIC BLOOD PRESSURE: 135 MMHG | TEMPERATURE: 98.8 F | HEART RATE: 45 BPM | DIASTOLIC BLOOD PRESSURE: 86 MMHG

## 2024-12-11 DIAGNOSIS — F84.0 AUTISM SPECTRUM DISORDER: ICD-10-CM

## 2024-12-11 RX ORDER — ARIPIPRAZOLE 30 MG/1
30 TABLET ORAL DAILY
Qty: 30 TABLET | Refills: 1 | Status: SHIPPED | OUTPATIENT
Start: 2024-12-11

## 2024-12-11 NOTE — TELEPHONE ENCOUNTER
Thanks, Faith! I have sent in the abilify prescription. I think if he seems his normal self, I will not worry too much about his pulse. Next time he is in, I will probably make sure we check both BP and HR manually.  Paulette

## 2024-12-11 NOTE — TELEPHONE ENCOUNTER
Call placed to patient's sister at preferred number on file, unable to reach, lvm with clinic number for call back.

## 2024-12-11 NOTE — TELEPHONE ENCOUNTER
"Return call placed to sister at preferred number on file:    - Pura Marina took blood pressure while on the phone with writer.  - Patient was playing with his cars but returned to his room  - First attempt blood pressure cuff reported error. Second attempt reported BP: 135/86 HR: 44  - Sister reports that his breathing is fine and he looks healthy.     - Sister looked at the bottle for Abilify again and it says \"Take 1 tablet 30 mg by mouth daily, do not start until after 2 weeks on 20 mg abilify\"   - Sister reports that she will be going to get new glasses because her eyes have been blurry.  - Sister reports that patient took 1/2 tablet for 2 nights ago he took half tablets for 2 days, and then has continued full tablets since yesterday. Still has 8 days worth of medication at home.    - Blood pressure checked again, blood pressure went away before she reported it to writer so she was not sure what it was but the heart rate was 45.  "

## 2024-12-11 NOTE — TELEPHONE ENCOUNTER
----- Message from Delicia Diop sent at 12/10/2024  2:20 PM CST -----  Regarding: reach out to Pura Marina tomorrow if possible  Irvin Cuevas    Pura Marina reported today that Carloz's most recent abilify prescription stated to take a half pill daily. I cannot for the life of me figure out why or how that may have happened. We tried to have her daughter text a picture of the bottle to us today but we were unable to reach her. I do not want to send in an abilify prescription until I know what he has been getting in the AM, especially because BP is elevated again. They have a BP cuff at home so I asked sister to check this tomorrow morning as well. Could you reach out and have her read the prescription bottle to you tomorrow as well as have her tell you what his BP was and let me know what you find out?    Thanks in advance!  Paulette

## 2025-02-11 DIAGNOSIS — F84.0 AUTISM SPECTRUM DISORDER: ICD-10-CM

## 2025-02-11 DIAGNOSIS — F43.10 PTSD (POST-TRAUMATIC STRESS DISORDER): ICD-10-CM

## 2025-02-11 RX ORDER — PRAZOSIN HYDROCHLORIDE 1 MG/1
3 CAPSULE ORAL AT BEDTIME
Qty: 90 CAPSULE | Refills: 0 | Status: SHIPPED | OUTPATIENT
Start: 2025-02-11

## 2025-02-11 RX ORDER — ARIPIPRAZOLE 30 MG/1
30 TABLET ORAL DAILY
Qty: 30 TABLET | Refills: 0 | Status: SHIPPED | OUTPATIENT
Start: 2025-02-11

## 2025-02-11 NOTE — TELEPHONE ENCOUNTER
Last seen: 12/10/2024  RTC: 4-6 weeks  Any patient initiated cancellations or no shows since last visit? YES - 1/21/2025  Next appt: None  Last filled:          Incoming refill from fax by pharmacy    Medication requested:   Pending Prescriptions:                       Disp   Refills    ARIPiprazole (ABILIFY) 30 MG tablet [Phar*30 tab*0            Sig: Take 1 tablet (30 mg) by mouth daily.    prazosin (MINIPRESS) 1 MG capsule [Pharma*90 cap*0            Sig: Take 3 capsules (3 mg) by mouth at bedtime.      From chart note:    -- will continue abilify but waiting on confirmation from yodit murphy regarding the dose he is currently getting to send in prescription        -- continue prazosin 3 mg  PO Q HS                 Sent    Is note signed/closed? Yes    Is this a 90 day request for a psych medication? No    If any red answers - send to provider    Medication refill approved per refill protocol.     Medication unable to be refilled by RN due to criteria not met as indicated.                 []Eligibility - not seen in the last year              []Supervision - no future appointment              []Compliance - no shows, cancellations or lapse in therapy              []Verification - order discrepancy              []Controlled medication              []Medication not included in policy              []90-day supply request              []Other:

## 2025-02-19 ENCOUNTER — OFFICE VISIT (OUTPATIENT)
Dept: PSYCHIATRY | Facility: CLINIC | Age: 45
End: 2025-02-19
Payer: MEDICAID

## 2025-02-19 VITALS
SYSTOLIC BLOOD PRESSURE: 150 MMHG | WEIGHT: 159.1 LBS | HEART RATE: 59 BPM | DIASTOLIC BLOOD PRESSURE: 88 MMHG | BODY MASS INDEX: 25.68 KG/M2

## 2025-02-19 DIAGNOSIS — F84.0 AUTISM SPECTRUM DISORDER: ICD-10-CM

## 2025-02-19 DIAGNOSIS — F43.10 PTSD (POST-TRAUMATIC STRESS DISORDER): Primary | ICD-10-CM

## 2025-02-19 DIAGNOSIS — F84.0 AUTISM: ICD-10-CM

## 2025-02-19 PROCEDURE — 99214 OFFICE O/P EST MOD 30 MIN: CPT | Performed by: NURSE PRACTITIONER

## 2025-02-19 PROCEDURE — G2211 COMPLEX E/M VISIT ADD ON: HCPCS | Performed by: NURSE PRACTITIONER

## 2025-02-19 RX ORDER — ARIPIPRAZOLE 30 MG/1
30 TABLET ORAL DAILY
Qty: 30 TABLET | Refills: 2 | Status: SHIPPED | OUTPATIENT
Start: 2025-02-19

## 2025-02-19 RX ORDER — PRAZOSIN HYDROCHLORIDE 1 MG/1
3 CAPSULE ORAL AT BEDTIME
Qty: 90 CAPSULE | Refills: 2 | Status: SHIPPED | OUTPATIENT
Start: 2025-02-19

## 2025-02-19 RX ORDER — TRAZODONE HYDROCHLORIDE 50 MG/1
50 TABLET ORAL AT BEDTIME
Qty: 30 TABLET | Refills: 2 | Status: SHIPPED | OUTPATIENT
Start: 2025-02-19

## 2025-02-19 NOTE — PROGRESS NOTES
PSYCHIATRY CLINIC PROGRESS NOTE    30 minute medication management   IDENTIFICATION: Carloz Drummond is a 45 year old male with previous psychiatric diagnoses of autism and PTSD. Pt's sister (pt's guardian) present for ongoing psychiatric follow-up and pt was seen for last transfer of care evaluation on 3/18/2020.   SUBJECTIVE / INTERIM HISTORY     The pt was last seen in clinic 12/10/24 at which time no medication changes were made. The patient reports good medication adherence. Since the last visit, he has taken his medication daily as prescribed. No side effects of the medication have been observed. He has a state ID now.     SYMPTOMS include overall mood stability, per Pura Marina. He is getting more rambunctious at home. This is likely related to not having any day programming anymore. Pura Marina voices frustration today that she has not heard from Carloz's CM regarding applications for day programming. Sleep is somewhat disrupted. He is waking early in the AM (2-3 AM) and then napping in the afternoon. Pura Salas thinks this is partially related to not having structure in his day anymore. No safety concerns reported.     Current Substance Use- none. Sober support- na     MEDICAL ROS          Reports A comprehensive review of systems was performed and is negative other than noted above.    PAST MEDICATION TRIALS    Naltrexone 50 mg daily previously prescribed for self-injury, unsure of effectiveness.    Hydroxyzine 50 mg at bedtime, decreased response over time.    Risperdal M-Tabs 1 mg PRN, noted to be ineffective for compulsive behavior (collecting/tearing up paper).   MEDICAL HISTORY      Primary Care Physician: [facility] [address] [place], [place] [id number]     Neurologic Hx:  head injury- none     seizure- none      LOC- none    other- na   Patient Active Problem List   Diagnosis    Autism     ALLERGY     No Known Allergies    MEDICATIONS      Current Outpatient Medications  "  Medication Sig Dispense Refill    ARIPiprazole (ABILIFY) 30 MG tablet Take 1 tablet (30 mg) by mouth daily. . APPOINTMENT REQUIRED FOR ADDITIONAL REFILLS [phone] 30 tablet 0    prazosin (MINIPRESS) 1 MG capsule Take 3 capsules (3 mg) by mouth at bedtime. . APPOINTMENT REQUIRED FOR ADDITIONAL REFILLS [phone] 90 capsule 0    traZODone (DESYREL) 50 MG tablet Take 1 tablet (50 mg) by mouth at bedtime. 30 tablet 1     No current facility-administered medications for this visit.       Drug Interaction Check is remarkable for:  None  VITALS    BP (!) 150/88   Pulse 59   Wt 72.2 kg (159 lb 1.6 oz)   BMI 25.68 kg/m    LABS  use [place]______       No results found for any previous visit.       MENTAL STATUS EXAM     Alertness: alert  and oriented  Appearance: casually groomed  Behavior/Demeanor: cooperative, with poor eye contact  Speech: 1-2 word phrases, short,some echolalia noted   Language: intact and limited vocabulary  Psychomotor: restless and fidgety  Mood:   \"happy\" per pt  Affect: blunted; was congruent to mood; was congruent to content  Thought Process/Associations: unremarkable  Thought Content: no observable suicidal or homicidal ideation   Perception: no observable hallucinations   Insight: limited  Judgment: limited  Cognition: does not appear grossly intact; formal cognitive testing was not done    PSYCHOLOGICAL TESTING:     none    ASSESSMENT     Carloz Drummond is a 45 year old male with psychiatric diagnoses of autism and PTSD. Carloz was present and cooperative. He answered some questions today and appeared to be listening to conversation most of the appointment. Pura Marina states that things are going well overall though sleep is disrupted related to less structure/routine. Will have Pura Marina sign consent to communicate today with JAZZY and Carloz's niece for assistance coordinating care in the future. Will reach out to SW related to day programming. No changes to medications today. " Follow-up planned for 3 months. Pura Marina to reach out sooner with any questions, concerns, or if an earlier appointment is needed.       The longitudinal plan of care for autism and PTSD  were addressed during this visit. Due to the added complexity in care, I will continue to support Carty in the subsequent management of this condition(s) and with the ongoing continuity of care of this condition(s).        TREATMENT RISK STATEMENT:  The risks, benefits, alternatives and potential adverse effects have been explained and are understood by the pt and pt's parent(s)/guardian.  Discussion of specific concerns included- N/A. The  pt and pt's parent(s)/guardian agrees to the treatment plan with the ability to do so. The  pt and pt's parent(s)/guardian knows to call the clinic for any problems or access emergency care if needed. There are no medical considerations relevant to treatment, as noted above. Substance use is not a problem as noted above.      Drug interaction check was done for any med changes and is discussed above.      DIAGNOSES                                                                                                      Encounter Diagnoses   Name Primary?    PTSD (post-traumatic stress disorder) Yes    Autism                                    PLAN                                                                                                 Medication Plan:         -- continue abilify 30 mg PO Q Day  sent        -- continue prazosin 3 mg  PO Q HS                 Sent       -- continue trazodone 50 mg PO Q HS                 sent    Labs:  none    Pt monitor [call for probs]: nothing specific needed    THERAPY: No Change    REFERRALS [CD, medical, other]:  none    :  none    Controlled Substance Contract was not completed    RTC: 3 months    CRISIS NUMBERS: Provided in AVS upon request of patient/guardian.

## 2025-02-19 NOTE — PATIENT INSTRUCTIONS
**For crisis resources, please see the information at the end of this document**   Patient Education    Thank you for coming to the [facility].    Lab Testing:  If you had lab testing today and your results are reassuring or normal they will be mailed to you or sent through [employer] within 7 days. If the lab tests need quick action we will call you with the results. The phone number we will call with results is # [phone] (home) . If this is not the best number please call our clinic and change the number.    Medication Refills:  If you need any refills please call your pharmacy and they will contact us. Our fax number for refills is [phone]. Please allow three business for refill processing. If you need to  your refill at a new pharmacy, please contact the new pharmacy directly. The new pharmacy will help you get your medications transferred.     Scheduling:  If you have any concerns about today's visit or wish to schedule another appointment please call our office during normal business hours [phone] (8-5:00 M-F)    Contact Us:  Please call [phone] during business hours (8-5:00 M-F).  If after clinic hours, or on the weekend, please call  [phone].    Financial Assistance [phone]  [facility] Billing [phone]  Central Billing Office, [facility]: [phone]  [facility] Billing [phone]  Medical Records [phone]  [facility] Patient Bill of Rights [email or web address]        MENTAL HEALTH CRISIS RESOURCES:  For a emergency help, please call 911 or go to the nearest Emergency Department.      Children's Emergency Walk-In Options:   [facility]:  [address] [place], [place], [address]  Children's \[facility] [place] [facility]\"" and [facility]:   [address], [address]  [place] - [address], [place], [place],  [address]    Adult Emergency Walk-In Options:  [facility]:  Critical access hospital[address] [facility], [place], [address]  [facility]:  [address] [place] [address]  [facility]:  [address], [place] [address]  [facility] :  [address] [place] [address]    [facility] Crisis Information:   Ashvin PRICE) - Adult: [phone]       Child: [phone]  Ronni - Adult: [phone]     Child: [phone]  [place]: [phone]  [place]: [phone]  [place]: [phone]    List of all [facility] resources:   https://[place].gov/dhs/people-we-serve/adults/health-care/mental-health/resources/crisis-contacts.jsp     National Crisis Information:   Call or text: '988'  National Suicide Prevention Lifeline: 9-003-743-TALK (1-452.454.8520) - for online chat options, visit https://suicidepreventionlifeline.org/chat/  Poison Control Center: 6-066-816-5503  Trans Lifeline: 3-305-906-0815 - Hotline for transgender people of all ages  The [employer] Project: 8-816-540-6503 - Hotline for LGBT youth      For Non-Emergency Support:   Fast Tracker: Mental Health & Substance Use Disorder Resources -   https://www.fasttrackermn.org/        Again thank you for choosing [facility] and please let us know how we can best partner with you to improve you and your family's health.    You may be receiving a survey regarding this appointment. We would love to have your feedback, both positive and negative. The survey is done by an external company, so your answers are anonymous.

## 2025-02-19 NOTE — NURSING NOTE
Chief Complaint   Patient presents with    RECHECK       BP (!) 150/88   Pulse 59   Wt 72.2 kg (159 lb 1.6 oz)   BMI 25.68 kg/m      Varun Rogers, EMT  February 19, 2025

## 2025-04-03 ENCOUNTER — TELEPHONE (OUTPATIENT)
Dept: PSYCHIATRY | Facility: CLINIC | Age: 45
End: 2025-04-03

## 2025-04-03 NOTE — TELEPHONE ENCOUNTER
Call placed to patient's  (consent to communicate on file) at [phone], unable to reach, lvm with voicemail requesting call back at main clinic line regarding status on day programming (with transportation) and if there is anything we can do to support it.

## 2025-05-06 NOTE — TELEPHONE ENCOUNTER
returned writer's call.     provided options and sister selected [facility].    An application was submitted March 11th but  has not heard back since then. Writer asked that  look into the status and share it with sister.

## 2025-05-07 NOTE — TELEPHONE ENCOUNTER
Irvin Cuevas,    Could you reach out to Pura Salas at some point this week? She requested to reschedule Carloz's appointment with me today because he is having insurance issues again and then she signed off before the [facility] told me that she had 'some behavioral concerns' so I was not able to follow-up with her about that. Could you also make sure she knows I still sent in refills and see if you can get anymore information about what the insurance issue might be? I do not want him to run out of medications again.     Thanks in advance,  Paulette

## 2025-05-08 NOTE — TELEPHONE ENCOUNTER
Writer placed call to sister and inquired about insurance complications.    - Sister stated that patient doesn't have medical and she was told this a while ago. She spoke with a [facility] worker (not Katya) that said appointments, transportation, and medication coverage should all be included so if she is able to access one she should be able to access all and she is able to access medications but wanted to confirm coverage before having an appointment that she might have to pay for out of pocket.  -  has been trying to contact [facility] to confirm coverage but has not been able to connect with anyone.  - Writer attempted to provide our billing contact to see if they are able to process a status check/prior authorization but the call was ended suddenly.  - Writer attempted to call sister back twice and sister picked up the phone but we could not hear each other.   - Billing information sent to  via email and clinic number provided for call back for questions.

## 2025-08-07 DIAGNOSIS — F84.0 AUTISM: ICD-10-CM

## 2025-08-07 RX ORDER — TRAZODONE HYDROCHLORIDE 50 MG/1
50 TABLET ORAL AT BEDTIME
Qty: 30 TABLET | Refills: 0 | Status: SHIPPED | OUTPATIENT
Start: 2025-08-07

## 2025-08-17 DIAGNOSIS — F84.0 AUTISM SPECTRUM DISORDER: ICD-10-CM

## 2025-08-17 DIAGNOSIS — F43.10 PTSD (POST-TRAUMATIC STRESS DISORDER): ICD-10-CM

## 2025-08-20 RX ORDER — PRAZOSIN HYDROCHLORIDE 1 MG/1
3 CAPSULE ORAL AT BEDTIME
Qty: 90 CAPSULE | Refills: 0 | Status: SHIPPED | OUTPATIENT
Start: 2025-08-20

## 2025-08-20 RX ORDER — ARIPIPRAZOLE 30 MG/1
30 TABLET ORAL DAILY
Qty: 30 TABLET | Refills: 0 | Status: SHIPPED | OUTPATIENT
Start: 2025-08-20